# Patient Record
Sex: MALE | Race: BLACK OR AFRICAN AMERICAN | NOT HISPANIC OR LATINO | URBAN - METROPOLITAN AREA
[De-identification: names, ages, dates, MRNs, and addresses within clinical notes are randomized per-mention and may not be internally consistent; named-entity substitution may affect disease eponyms.]

---

## 2021-01-26 VITALS
DIASTOLIC BLOOD PRESSURE: 81 MMHG | OXYGEN SATURATION: 95 % | RESPIRATION RATE: 16 BRPM | SYSTOLIC BLOOD PRESSURE: 145 MMHG | HEART RATE: 110 BPM | TEMPERATURE: 98 F

## 2021-01-26 NOTE — ED ADULT TRIAGE NOTE - ARRIVAL INFO ADDITIONAL COMMENTS
pt sent from [facility] with an H/H of 3 and 12.  pt has hx of MS and denies any bloody stools.  pt has PICC line in place left upper arm for IV antibiotics.

## 2021-01-27 ENCOUNTER — INPATIENT (INPATIENT)
Facility: HOSPITAL | Age: 56
LOS: 5 days | Discharge: NURSING, REHAB OR LONG-TERM CARE | DRG: 377 | End: 2021-02-02
Attending: STUDENT IN AN ORGANIZED HEALTH CARE EDUCATION/TRAINING PROGRAM | Admitting: PSYCHIATRY & NEUROLOGY
Payer: MEDICARE

## 2021-01-27 DIAGNOSIS — D50.0 IRON DEFICIENCY ANEMIA SECONDARY TO BLOOD LOSS (CHRONIC): ICD-10-CM

## 2021-01-27 DIAGNOSIS — F31.9 BIPOLAR DISORDER, UNSPECIFIED: ICD-10-CM

## 2021-01-27 DIAGNOSIS — R53.2 FUNCTIONAL QUADRIPLEGIA: ICD-10-CM

## 2021-01-27 DIAGNOSIS — N31.9 NEUROMUSCULAR DYSFUNCTION OF BLADDER, UNSPECIFIED: ICD-10-CM

## 2021-01-27 DIAGNOSIS — K26.4 CHRONIC OR UNSPECIFIED DUODENAL ULCER WITH HEMORRHAGE: ICD-10-CM

## 2021-01-27 DIAGNOSIS — I24.8 OTHER FORMS OF ACUTE ISCHEMIC HEART DISEASE: ICD-10-CM

## 2021-01-27 DIAGNOSIS — G35 MULTIPLE SCLEROSIS: ICD-10-CM

## 2021-01-27 DIAGNOSIS — J96.01 ACUTE RESPIRATORY FAILURE WITH HYPOXIA: ICD-10-CM

## 2021-01-27 DIAGNOSIS — K92.1 MELENA: ICD-10-CM

## 2021-01-27 DIAGNOSIS — R50.9 FEVER, UNSPECIFIED: ICD-10-CM

## 2021-01-27 DIAGNOSIS — K59.00 CONSTIPATION, UNSPECIFIED: ICD-10-CM

## 2021-01-27 DIAGNOSIS — L89.154 PRESSURE ULCER OF SACRAL REGION, STAGE 4: ICD-10-CM

## 2021-01-27 DIAGNOSIS — Z29.9 ENCOUNTER FOR PROPHYLACTIC MEASURES, UNSPECIFIED: ICD-10-CM

## 2021-01-27 DIAGNOSIS — I10 ESSENTIAL (PRIMARY) HYPERTENSION: ICD-10-CM

## 2021-01-27 DIAGNOSIS — K29.70 GASTRITIS, UNSPECIFIED, WITHOUT BLEEDING: ICD-10-CM

## 2021-01-27 DIAGNOSIS — G62.9 POLYNEUROPATHY, UNSPECIFIED: ICD-10-CM

## 2021-01-27 DIAGNOSIS — D64.9 ANEMIA, UNSPECIFIED: ICD-10-CM

## 2021-01-27 DIAGNOSIS — E78.5 HYPERLIPIDEMIA, UNSPECIFIED: ICD-10-CM

## 2021-01-27 LAB
ALBUMIN SERPL ELPH-MCNC: 2.7 G/DL — LOW (ref 3.3–5)
ALBUMIN SERPL ELPH-MCNC: 2.7 G/DL — LOW (ref 3.3–5)
ALP SERPL-CCNC: 54 U/L — SIGNIFICANT CHANGE UP (ref 40–120)
ALP SERPL-CCNC: 57 U/L — SIGNIFICANT CHANGE UP (ref 40–120)
ALT FLD-CCNC: 9 U/L — LOW (ref 10–45)
ALT FLD-CCNC: 9 U/L — LOW (ref 10–45)
ANION GAP SERPL CALC-SCNC: 10 MMOL/L — SIGNIFICANT CHANGE UP (ref 5–17)
ANION GAP SERPL CALC-SCNC: 11 MMOL/L — SIGNIFICANT CHANGE UP (ref 5–17)
ANION GAP SERPL CALC-SCNC: 9 MMOL/L — SIGNIFICANT CHANGE UP (ref 5–17)
APPEARANCE UR: CLEAR — SIGNIFICANT CHANGE UP
APTT BLD: 25.8 SEC — LOW (ref 27.5–35.5)
AST SERPL-CCNC: 16 U/L — SIGNIFICANT CHANGE UP (ref 10–40)
AST SERPL-CCNC: 16 U/L — SIGNIFICANT CHANGE UP (ref 10–40)
BACTERIA # UR AUTO: PRESENT /HPF
BASOPHILS # BLD AUTO: 0.01 K/UL — SIGNIFICANT CHANGE UP (ref 0–0.2)
BASOPHILS # BLD AUTO: 0.02 K/UL — SIGNIFICANT CHANGE UP (ref 0–0.2)
BASOPHILS NFR BLD AUTO: 0.1 % — SIGNIFICANT CHANGE UP (ref 0–2)
BASOPHILS NFR BLD AUTO: 0.2 % — SIGNIFICANT CHANGE UP (ref 0–2)
BILIRUB SERPL-MCNC: 0.3 MG/DL — SIGNIFICANT CHANGE UP (ref 0.2–1.2)
BILIRUB SERPL-MCNC: 0.4 MG/DL — SIGNIFICANT CHANGE UP (ref 0.2–1.2)
BILIRUB UR-MCNC: NEGATIVE — SIGNIFICANT CHANGE UP
BLD GP AB SCN SERPL QL: NEGATIVE — SIGNIFICANT CHANGE UP
BUN SERPL-MCNC: 11 MG/DL — SIGNIFICANT CHANGE UP (ref 7–23)
BUN SERPL-MCNC: 13 MG/DL — SIGNIFICANT CHANGE UP (ref 7–23)
BUN SERPL-MCNC: 15 MG/DL — SIGNIFICANT CHANGE UP (ref 7–23)
CALCIUM SERPL-MCNC: 7.3 MG/DL — LOW (ref 8.4–10.5)
CALCIUM SERPL-MCNC: 7.5 MG/DL — LOW (ref 8.4–10.5)
CALCIUM SERPL-MCNC: 7.8 MG/DL — LOW (ref 8.4–10.5)
CHLORIDE SERPL-SCNC: 110 MMOL/L — HIGH (ref 96–108)
CHLORIDE SERPL-SCNC: 113 MMOL/L — HIGH (ref 96–108)
CHLORIDE SERPL-SCNC: 114 MMOL/L — HIGH (ref 96–108)
CO2 SERPL-SCNC: 23 MMOL/L — SIGNIFICANT CHANGE UP (ref 22–31)
CO2 SERPL-SCNC: 23 MMOL/L — SIGNIFICANT CHANGE UP (ref 22–31)
CO2 SERPL-SCNC: 26 MMOL/L — SIGNIFICANT CHANGE UP (ref 22–31)
COLOR SPEC: YELLOW — SIGNIFICANT CHANGE UP
COMMENT - URINE: SIGNIFICANT CHANGE UP
CREAT SERPL-MCNC: 0.72 MG/DL — SIGNIFICANT CHANGE UP (ref 0.5–1.3)
CREAT SERPL-MCNC: 0.81 MG/DL — SIGNIFICANT CHANGE UP (ref 0.5–1.3)
CREAT SERPL-MCNC: 0.83 MG/DL — SIGNIFICANT CHANGE UP (ref 0.5–1.3)
DIFF PNL FLD: NEGATIVE — SIGNIFICANT CHANGE UP
EOSINOPHIL # BLD AUTO: 0.16 K/UL — SIGNIFICANT CHANGE UP (ref 0–0.5)
EOSINOPHIL # BLD AUTO: 0.19 K/UL — SIGNIFICANT CHANGE UP (ref 0–0.5)
EOSINOPHIL NFR BLD AUTO: 1.7 % — SIGNIFICANT CHANGE UP (ref 0–6)
EOSINOPHIL NFR BLD AUTO: 1.8 % — SIGNIFICANT CHANGE UP (ref 0–6)
EPI CELLS # UR: SIGNIFICANT CHANGE UP /HPF (ref 0–5)
FERRITIN SERPL-MCNC: 27 NG/ML — LOW (ref 30–400)
FIBRINOGEN PPP-MCNC: 391 MG/DL — SIGNIFICANT CHANGE UP (ref 258–438)
FOLATE SERPL-MCNC: >20 NG/ML — SIGNIFICANT CHANGE UP
GLUCOSE BLDC GLUCOMTR-MCNC: 102 MG/DL — HIGH (ref 70–99)
GLUCOSE SERPL-MCNC: 106 MG/DL — HIGH (ref 70–99)
GLUCOSE SERPL-MCNC: 89 MG/DL — SIGNIFICANT CHANGE UP (ref 70–99)
GLUCOSE SERPL-MCNC: 93 MG/DL — SIGNIFICANT CHANGE UP (ref 70–99)
GLUCOSE UR QL: NEGATIVE — SIGNIFICANT CHANGE UP
HCT VFR BLD CALC: 13.1 % — CRITICAL LOW (ref 39–50)
HCT VFR BLD CALC: 18.9 % — CRITICAL LOW (ref 39–50)
HCT VFR BLD CALC: 20.7 % — CRITICAL LOW (ref 39–50)
HCT VFR BLD CALC: 24.4 % — LOW (ref 39–50)
HCT VFR BLD CALC: 25.8 % — LOW (ref 39–50)
HGB BLD-MCNC: 3.5 G/DL — CRITICAL LOW (ref 13–17)
HGB BLD-MCNC: 5.6 G/DL — CRITICAL LOW (ref 13–17)
HGB BLD-MCNC: 6.3 G/DL — CRITICAL LOW (ref 13–17)
HGB BLD-MCNC: 7.3 G/DL — LOW (ref 13–17)
HGB BLD-MCNC: 8 G/DL — LOW (ref 13–17)
IMM GRANULOCYTES NFR BLD AUTO: 1.4 % — SIGNIFICANT CHANGE UP (ref 0–1.5)
IMM GRANULOCYTES NFR BLD AUTO: 1.8 % — HIGH (ref 0–1.5)
INR BLD: 1.14 — SIGNIFICANT CHANGE UP (ref 0.88–1.16)
IRON SATN MFR SERPL: 11 % — LOW (ref 16–55)
IRON SATN MFR SERPL: 24 UG/DL — LOW (ref 45–165)
KETONES UR-MCNC: NEGATIVE — SIGNIFICANT CHANGE UP
LACTATE SERPL-SCNC: 1.3 MMOL/L — SIGNIFICANT CHANGE UP (ref 0.5–2)
LDH SERPL L TO P-CCNC: 249 U/L — HIGH (ref 50–242)
LEGIONELLA AG UR QL: NEGATIVE — SIGNIFICANT CHANGE UP
LEUKOCYTE ESTERASE UR-ACNC: ABNORMAL
LYMPHOCYTES # BLD AUTO: 1.32 K/UL — SIGNIFICANT CHANGE UP (ref 1–3.3)
LYMPHOCYTES # BLD AUTO: 1.51 K/UL — SIGNIFICANT CHANGE UP (ref 1–3.3)
LYMPHOCYTES # BLD AUTO: 11.7 % — LOW (ref 13–44)
LYMPHOCYTES # BLD AUTO: 16.8 % — SIGNIFICANT CHANGE UP (ref 13–44)
MAGNESIUM SERPL-MCNC: 2.2 MG/DL — SIGNIFICANT CHANGE UP (ref 1.6–2.6)
MAGNESIUM SERPL-MCNC: 2.2 MG/DL — SIGNIFICANT CHANGE UP (ref 1.6–2.6)
MCHC RBC-ENTMCNC: 26.7 GM/DL — LOW (ref 32–36)
MCHC RBC-ENTMCNC: 29.2 PG — SIGNIFICANT CHANGE UP (ref 27–34)
MCHC RBC-ENTMCNC: 29.3 PG — SIGNIFICANT CHANGE UP (ref 27–34)
MCHC RBC-ENTMCNC: 29.4 PG — SIGNIFICANT CHANGE UP (ref 27–34)
MCHC RBC-ENTMCNC: 29.9 GM/DL — LOW (ref 32–36)
MCHC RBC-ENTMCNC: 30.3 PG — SIGNIFICANT CHANGE UP (ref 27–34)
MCHC RBC-ENTMCNC: 30.4 GM/DL — LOW (ref 32–36)
MCHC RBC-ENTMCNC: 31 GM/DL — LOW (ref 32–36)
MCV RBC AUTO: 109.2 FL — HIGH (ref 80–100)
MCV RBC AUTO: 94.5 FL — SIGNIFICANT CHANGE UP (ref 80–100)
MCV RBC AUTO: 98.4 FL — SIGNIFICANT CHANGE UP (ref 80–100)
MCV RBC AUTO: 99.5 FL — SIGNIFICANT CHANGE UP (ref 80–100)
MONOCYTES # BLD AUTO: 0.63 K/UL — SIGNIFICANT CHANGE UP (ref 0–0.9)
MONOCYTES # BLD AUTO: 0.73 K/UL — SIGNIFICANT CHANGE UP (ref 0–0.9)
MONOCYTES NFR BLD AUTO: 6.5 % — SIGNIFICANT CHANGE UP (ref 2–14)
MONOCYTES NFR BLD AUTO: 7 % — SIGNIFICANT CHANGE UP (ref 2–14)
NEUTROPHILS # BLD AUTO: 6.52 K/UL — SIGNIFICANT CHANGE UP (ref 1.8–7.4)
NEUTROPHILS # BLD AUTO: 8.88 K/UL — HIGH (ref 1.8–7.4)
NEUTROPHILS NFR BLD AUTO: 72.5 % — SIGNIFICANT CHANGE UP (ref 43–77)
NEUTROPHILS NFR BLD AUTO: 78.5 % — HIGH (ref 43–77)
NITRITE UR-MCNC: NEGATIVE — SIGNIFICANT CHANGE UP
NRBC # BLD: 0 /100 WBCS — SIGNIFICANT CHANGE UP (ref 0–0)
NRBC # BLD: 1 /100 WBCS — HIGH (ref 0–0)
NRBC # BLD: 2 /100 WBCS — HIGH (ref 0–0)
NRBC # BLD: 2 /100 WBCS — HIGH (ref 0–0)
OB PNL STL: POSITIVE
OSMOLALITY SERPL: 310 MOSM/KG — HIGH (ref 275–300)
PH UR: 6 — SIGNIFICANT CHANGE UP (ref 5–8)
PHOSPHATE SERPL-MCNC: 2.9 MG/DL — SIGNIFICANT CHANGE UP (ref 2.5–4.5)
PLATELET # BLD AUTO: 141 K/UL — LOW (ref 150–400)
PLATELET # BLD AUTO: 154 K/UL — SIGNIFICANT CHANGE UP (ref 150–400)
PLATELET # BLD AUTO: 166 K/UL — SIGNIFICANT CHANGE UP (ref 150–400)
PLATELET # BLD AUTO: 229 K/UL — SIGNIFICANT CHANGE UP (ref 150–400)
POTASSIUM SERPL-MCNC: 3.8 MMOL/L — SIGNIFICANT CHANGE UP (ref 3.5–5.3)
POTASSIUM SERPL-SCNC: 3.8 MMOL/L — SIGNIFICANT CHANGE UP (ref 3.5–5.3)
PROCALCITONIN SERPL-MCNC: 0.5 NG/ML — HIGH (ref 0.02–0.1)
PROT SERPL-MCNC: 5.6 G/DL — LOW (ref 6–8.3)
PROT SERPL-MCNC: 6.1 G/DL — SIGNIFICANT CHANGE UP (ref 6–8.3)
PROT UR-MCNC: ABNORMAL MG/DL
PROTHROM AB SERPL-ACNC: 13.6 SEC — SIGNIFICANT CHANGE UP (ref 10.6–13.6)
RBC # BLD: 1.2 M/UL — LOW (ref 4.2–5.8)
RBC # BLD: 2.08 M/UL — LOW (ref 4.2–5.8)
RBC # BLD: 2.48 M/UL — LOW (ref 4.2–5.8)
RBC # BLD: 2.73 M/UL — LOW (ref 4.2–5.8)
RBC # FLD: 18.7 % — HIGH (ref 10.3–14.5)
RBC # FLD: 19.1 % — HIGH (ref 10.3–14.5)
RBC # FLD: 19.6 % — HIGH (ref 10.3–14.5)
RBC # FLD: 22.5 % — HIGH (ref 10.3–14.5)
RBC CASTS # UR COMP ASSIST: < 5 /HPF — SIGNIFICANT CHANGE UP
RETICS #: 175.8 K/UL — HIGH (ref 25–125)
RETICS/RBC NFR: 8.4 % — HIGH (ref 0.5–2.5)
RH IG SCN BLD-IMP: POSITIVE — SIGNIFICANT CHANGE UP
RH IG SCN BLD-IMP: POSITIVE — SIGNIFICANT CHANGE UP
S PNEUM AG UR QL: NEGATIVE — SIGNIFICANT CHANGE UP
SARS-COV-2 IGG SERPL QL IA: POSITIVE
SARS-COV-2 IGM SERPL IA-ACNC: 1.8 INDEX — HIGH
SARS-COV-2 RNA SPEC QL NAA+PROBE: SIGNIFICANT CHANGE UP
SODIUM SERPL-SCNC: 145 MMOL/L — SIGNIFICANT CHANGE UP (ref 135–145)
SODIUM SERPL-SCNC: 147 MMOL/L — HIGH (ref 135–145)
SODIUM SERPL-SCNC: 147 MMOL/L — HIGH (ref 135–145)
SP GR SPEC: 1.02 — SIGNIFICANT CHANGE UP (ref 1–1.03)
T4 AB SER-ACNC: 5.14 UG/DL — SIGNIFICANT CHANGE UP (ref 3.17–11.72)
TIBC SERPL-MCNC: 211 UG/DL — LOW (ref 220–430)
TRANSFERRIN SERPL-MCNC: 158 MG/DL — LOW (ref 200–360)
TROPONIN T SERPL-MCNC: 0.03 NG/ML — HIGH (ref 0–0.01)
TROPONIN T SERPL-MCNC: 0.03 NG/ML — HIGH (ref 0–0.01)
TSH SERPL-MCNC: 0.78 UIU/ML — SIGNIFICANT CHANGE UP (ref 0.35–4.94)
UIBC SERPL-MCNC: 187 UG/DL — SIGNIFICANT CHANGE UP (ref 110–370)
UROBILINOGEN FLD QL: 0.2 E.U./DL — SIGNIFICANT CHANGE UP
VANCOMYCIN TROUGH SERPL-MCNC: 9.9 UG/ML — LOW (ref 10–20)
VIT B12 SERPL-MCNC: 1339 PG/ML — HIGH (ref 232–1245)
WBC # BLD: 10.61 K/UL — HIGH (ref 3.8–10.5)
WBC # BLD: 11.3 K/UL — HIGH (ref 3.8–10.5)
WBC # BLD: 7.28 K/UL — SIGNIFICANT CHANGE UP (ref 3.8–10.5)
WBC # BLD: 8.99 K/UL — SIGNIFICANT CHANGE UP (ref 3.8–10.5)
WBC # FLD AUTO: 10.61 K/UL — HIGH (ref 3.8–10.5)
WBC # FLD AUTO: 11.3 K/UL — HIGH (ref 3.8–10.5)
WBC # FLD AUTO: 7.28 K/UL — SIGNIFICANT CHANGE UP (ref 3.8–10.5)
WBC # FLD AUTO: 8.99 K/UL — SIGNIFICANT CHANGE UP (ref 3.8–10.5)
WBC UR QL: ABNORMAL /HPF

## 2021-01-27 PROCEDURE — 99222 1ST HOSP IP/OBS MODERATE 55: CPT

## 2021-01-27 PROCEDURE — 71045 X-RAY EXAM CHEST 1 VIEW: CPT | Mod: 26

## 2021-01-27 PROCEDURE — 93010 ELECTROCARDIOGRAM REPORT: CPT

## 2021-01-27 PROCEDURE — 99223 1ST HOSP IP/OBS HIGH 75: CPT | Mod: GC

## 2021-01-27 PROCEDURE — 99291 CRITICAL CARE FIRST HOUR: CPT

## 2021-01-27 PROCEDURE — 99053 MED SERV 10PM-8AM 24 HR FAC: CPT

## 2021-01-27 RX ORDER — SODIUM CHLORIDE 0.65 %
1 AEROSOL, SPRAY (ML) NASAL
Refills: 0 | Status: DISCONTINUED | OUTPATIENT
Start: 2021-01-27 | End: 2021-02-02

## 2021-01-27 RX ORDER — MORPHINE SULFATE 50 MG/1
15 CAPSULE, EXTENDED RELEASE ORAL EVERY 8 HOURS
Refills: 0 | Status: DISCONTINUED | OUTPATIENT
Start: 2021-01-27 | End: 2021-01-27

## 2021-01-27 RX ORDER — MULTIVIT-MIN/FERROUS GLUCONATE 9 MG/15 ML
1 LIQUID (ML) ORAL
Qty: 0 | Refills: 0 | DISCHARGE

## 2021-01-27 RX ORDER — ASCORBIC ACID 60 MG
1 TABLET,CHEWABLE ORAL
Qty: 0 | Refills: 0 | DISCHARGE

## 2021-01-27 RX ORDER — SOD SULF/SODIUM/NAHCO3/KCL/PEG
2000 SOLUTION, RECONSTITUTED, ORAL ORAL ONCE
Refills: 0 | Status: COMPLETED | OUTPATIENT
Start: 2021-01-27 | End: 2021-01-27

## 2021-01-27 RX ORDER — DOCUSATE SODIUM 100 MG
1 CAPSULE ORAL
Qty: 0 | Refills: 0 | DISCHARGE

## 2021-01-27 RX ORDER — CHOLECALCIFEROL (VITAMIN D3) 125 MCG
1000 CAPSULE ORAL DAILY
Refills: 0 | Status: DISCONTINUED | OUTPATIENT
Start: 2021-01-27 | End: 2021-02-02

## 2021-01-27 RX ORDER — MORPHINE SULFATE 50 MG/1
15 CAPSULE, EXTENDED RELEASE ORAL EVERY 8 HOURS
Refills: 0 | Status: DISCONTINUED | OUTPATIENT
Start: 2021-01-27 | End: 2021-02-02

## 2021-01-27 RX ORDER — METOPROLOL TARTRATE 50 MG
50 TABLET ORAL
Refills: 0 | Status: DISCONTINUED | OUTPATIENT
Start: 2021-01-27 | End: 2021-01-29

## 2021-01-27 RX ORDER — ACETAMINOPHEN 500 MG
2 TABLET ORAL
Qty: 0 | Refills: 0 | DISCHARGE

## 2021-01-27 RX ORDER — SENNA PLUS 8.6 MG/1
2 TABLET ORAL AT BEDTIME
Refills: 0 | Status: DISCONTINUED | OUTPATIENT
Start: 2021-01-27 | End: 2021-02-02

## 2021-01-27 RX ORDER — ATORVASTATIN CALCIUM 80 MG/1
20 TABLET, FILM COATED ORAL AT BEDTIME
Refills: 0 | Status: DISCONTINUED | OUTPATIENT
Start: 2021-01-27 | End: 2021-02-02

## 2021-01-27 RX ORDER — DOCUSATE SODIUM 100 MG
3 CAPSULE ORAL
Qty: 0 | Refills: 0 | DISCHARGE

## 2021-01-27 RX ORDER — ACETAMINOPHEN 500 MG
650 TABLET ORAL EVERY 6 HOURS
Refills: 0 | Status: DISCONTINUED | OUTPATIENT
Start: 2021-01-27 | End: 2021-02-02

## 2021-01-27 RX ORDER — CHOLECALCIFEROL (VITAMIN D3) 125 MCG
1000 CAPSULE ORAL
Qty: 0 | Refills: 0 | DISCHARGE

## 2021-01-27 RX ORDER — ZINC SULFATE TAB 220 MG (50 MG ZINC EQUIVALENT) 220 (50 ZN) MG
220 TAB ORAL DAILY
Refills: 0 | Status: DISCONTINUED | OUTPATIENT
Start: 2021-01-27 | End: 2021-02-02

## 2021-01-27 RX ORDER — PANTOPRAZOLE SODIUM 20 MG/1
80 TABLET, DELAYED RELEASE ORAL ONCE
Refills: 0 | Status: COMPLETED | OUTPATIENT
Start: 2021-01-27 | End: 2021-01-27

## 2021-01-27 RX ORDER — SODIUM CHLORIDE 0.65 %
0 AEROSOL, SPRAY (ML) NASAL
Qty: 0 | Refills: 0 | DISCHARGE

## 2021-01-27 RX ORDER — METOPROLOL TARTRATE 50 MG
1 TABLET ORAL
Qty: 0 | Refills: 0 | DISCHARGE

## 2021-01-27 RX ORDER — DIAZEPAM 5 MG
5 TABLET ORAL ONCE
Refills: 0 | Status: DISCONTINUED | OUTPATIENT
Start: 2021-01-27 | End: 2021-01-27

## 2021-01-27 RX ORDER — ZINC SULFATE TAB 220 MG (50 MG ZINC EQUIVALENT) 220 (50 ZN) MG
1 TAB ORAL
Qty: 0 | Refills: 0 | DISCHARGE

## 2021-01-27 RX ORDER — POTASSIUM CHLORIDE 20 MEQ
20 PACKET (EA) ORAL ONCE
Refills: 0 | Status: COMPLETED | OUTPATIENT
Start: 2021-01-27 | End: 2021-01-27

## 2021-01-27 RX ORDER — SENNA PLUS 8.6 MG/1
2 TABLET ORAL
Qty: 0 | Refills: 0 | DISCHARGE

## 2021-01-27 RX ORDER — OMEGA-3 ACID ETHYL ESTERS 1 G
1 CAPSULE ORAL
Qty: 0 | Refills: 0 | DISCHARGE

## 2021-01-27 RX ORDER — SODIUM CHLORIDE 9 MG/ML
1000 INJECTION INTRAMUSCULAR; INTRAVENOUS; SUBCUTANEOUS ONCE
Refills: 0 | Status: COMPLETED | OUTPATIENT
Start: 2021-01-27 | End: 2021-01-27

## 2021-01-27 RX ORDER — MULTIVIT-MIN/FERROUS GLUCONATE 9 MG/15 ML
1 LIQUID (ML) ORAL DAILY
Refills: 0 | Status: DISCONTINUED | OUTPATIENT
Start: 2021-01-27 | End: 2021-02-02

## 2021-01-27 RX ORDER — FERROUS SULFATE 325(65) MG
325 TABLET ORAL DAILY
Refills: 0 | Status: DISCONTINUED | OUTPATIENT
Start: 2021-01-27 | End: 2021-02-02

## 2021-01-27 RX ORDER — SODIUM CHLORIDE 0.65 %
1 AEROSOL, SPRAY (ML) NASAL
Qty: 0 | Refills: 0 | DISCHARGE

## 2021-01-27 RX ORDER — MORPHINE SULFATE 50 MG/1
1 CAPSULE, EXTENDED RELEASE ORAL
Qty: 0 | Refills: 0 | DISCHARGE

## 2021-01-27 RX ORDER — SODIUM CHLORIDE 9 MG/ML
1000 INJECTION, SOLUTION INTRAVENOUS
Refills: 0 | Status: DISCONTINUED | OUTPATIENT
Start: 2021-01-27 | End: 2021-01-28

## 2021-01-27 RX ORDER — POLYETHYLENE GLYCOL 3350 17 G/17G
17 POWDER, FOR SOLUTION ORAL
Qty: 0 | Refills: 0 | DISCHARGE

## 2021-01-27 RX ORDER — LACTULOSE 10 G/15ML
30 SOLUTION ORAL
Qty: 0 | Refills: 0 | DISCHARGE

## 2021-01-27 RX ORDER — PIPERACILLIN AND TAZOBACTAM 4; .5 G/20ML; G/20ML
3.38 INJECTION, POWDER, LYOPHILIZED, FOR SOLUTION INTRAVENOUS EVERY 8 HOURS
Refills: 0 | Status: DISCONTINUED | OUTPATIENT
Start: 2021-01-27 | End: 2021-01-27

## 2021-01-27 RX ORDER — ASCORBIC ACID 60 MG
500 TABLET,CHEWABLE ORAL DAILY
Refills: 0 | Status: DISCONTINUED | OUTPATIENT
Start: 2021-01-27 | End: 2021-02-02

## 2021-01-27 RX ORDER — OXYBUTYNIN CHLORIDE 5 MG
1 TABLET ORAL
Qty: 0 | Refills: 0 | DISCHARGE

## 2021-01-27 RX ORDER — ATORVASTATIN CALCIUM 80 MG/1
1 TABLET, FILM COATED ORAL
Qty: 0 | Refills: 0 | DISCHARGE

## 2021-01-27 RX ORDER — PANTOPRAZOLE SODIUM 20 MG/1
8 TABLET, DELAYED RELEASE ORAL
Qty: 80 | Refills: 0 | Status: DISCONTINUED | OUTPATIENT
Start: 2021-01-27 | End: 2021-01-28

## 2021-01-27 RX ORDER — METOPROLOL TARTRATE 50 MG
50 TABLET ORAL
Refills: 0 | Status: DISCONTINUED | OUTPATIENT
Start: 2021-01-27 | End: 2021-01-27

## 2021-01-27 RX ORDER — FERROUS SULFATE 325(65) MG
1 TABLET ORAL
Qty: 0 | Refills: 0 | DISCHARGE

## 2021-01-27 RX ORDER — OXYBUTYNIN CHLORIDE 5 MG
5 TABLET ORAL EVERY 12 HOURS
Refills: 0 | Status: DISCONTINUED | OUTPATIENT
Start: 2021-01-27 | End: 2021-02-02

## 2021-01-27 RX ADMIN — Medication 5 MILLIGRAM(S): at 06:15

## 2021-01-27 RX ADMIN — Medication 325 MILLIGRAM(S): at 17:27

## 2021-01-27 RX ADMIN — MORPHINE SULFATE 15 MILLIGRAM(S): 50 CAPSULE, EXTENDED RELEASE ORAL at 21:37

## 2021-01-27 RX ADMIN — PANTOPRAZOLE SODIUM 10 MG/HR: 20 TABLET, DELAYED RELEASE ORAL at 20:28

## 2021-01-27 RX ADMIN — SODIUM CHLORIDE 1000 MILLILITER(S): 9 INJECTION INTRAMUSCULAR; INTRAVENOUS; SUBCUTANEOUS at 01:04

## 2021-01-27 RX ADMIN — PIPERACILLIN AND TAZOBACTAM 25 GRAM(S): 4; .5 INJECTION, POWDER, LYOPHILIZED, FOR SOLUTION INTRAVENOUS at 06:20

## 2021-01-27 RX ADMIN — SODIUM CHLORIDE 80 MILLILITER(S): 9 INJECTION, SOLUTION INTRAVENOUS at 17:28

## 2021-01-27 RX ADMIN — Medication 50 MILLIGRAM(S): at 17:28

## 2021-01-27 RX ADMIN — ATORVASTATIN CALCIUM 20 MILLIGRAM(S): 80 TABLET, FILM COATED ORAL at 21:37

## 2021-01-27 RX ADMIN — PANTOPRAZOLE SODIUM 10 MG/HR: 20 TABLET, DELAYED RELEASE ORAL at 02:41

## 2021-01-27 RX ADMIN — SODIUM CHLORIDE 1000 MILLILITER(S): 9 INJECTION INTRAMUSCULAR; INTRAVENOUS; SUBCUTANEOUS at 03:15

## 2021-01-27 RX ADMIN — Medication 5 MILLIGRAM(S): at 17:27

## 2021-01-27 RX ADMIN — Medication 50 MILLIGRAM(S): at 09:30

## 2021-01-27 RX ADMIN — Medication 50 MILLIGRAM(S): at 17:27

## 2021-01-27 RX ADMIN — PANTOPRAZOLE SODIUM 10 MG/HR: 20 TABLET, DELAYED RELEASE ORAL at 12:04

## 2021-01-27 RX ADMIN — Medication 1 TABLET(S): at 19:04

## 2021-01-27 RX ADMIN — ZINC SULFATE TAB 220 MG (50 MG ZINC EQUIVALENT) 220 MILLIGRAM(S): 220 (50 ZN) TAB at 21:37

## 2021-01-27 RX ADMIN — Medication 500 MILLIGRAM(S): at 17:27

## 2021-01-27 RX ADMIN — Medication 1000 UNIT(S): at 19:04

## 2021-01-27 RX ADMIN — Medication 2000 MILLILITER(S): at 19:04

## 2021-01-27 RX ADMIN — PANTOPRAZOLE SODIUM 80 MILLIGRAM(S): 20 TABLET, DELAYED RELEASE ORAL at 00:15

## 2021-01-27 NOTE — H&P ADULT - ASSESSMENT
Pt is a 56 y/o male, poor historian w/ pmhx of MS, quadriplegia, iron deficiency anemia, HTN, HLD, bipolar disorder, sicca syndrome, polyneuropathy, chronic pain, pressure ulcer, mrsa infection, presenting from nursing home with Hb of 3.6. Pt admitted for severe anemia likely 2/2 to possible upper GI bleed.

## 2021-01-27 NOTE — PROGRESS NOTE ADULT - PROBLEM SELECTOR PLAN 1
Patient with hx of PERI, poor historian but reports history of "low blood levels" Now presented with Hb 3.5 and FOBT positive in ED. High suspicion of upper GI bleed  - f/u iron panel, B12 and folate  - f/u fibrinogen   - s/p 2U PRBC in ED, getting another 1U now  - check post transfusion CBC, will likely require additional PRBC  - f/u CBC q4h after transfusion  - Transfuse Hb >7 IMPROVING  Patient with hx of PERI, poor historian but reports history of "low blood levels" Presented w/ 3.5 and FOBT positive in ED.   -S/P 4u prbc on adm, with improvement in hb to 8.0. Will f/u 6 PM repeat CBC.   - B12 1339, Folate > 20  - Iron studies showing PERI  - Transfuse Hb >7  - GI workup as detailed below.

## 2021-01-27 NOTE — H&P ADULT - HISTORY OF PRESENT ILLNESS
Pt is a 56 y/o male, poor historian w/ pmhx of MS, quadriplegia, iron deficiency anemia, HTN, HLD, bipolar disorder, sicca syndrome, polyneuropathy, chronic pain, pressure ulcers, mrsa infections, presenting from nursing home with Hb of 3.6. Pt reports complaints of intermittent chest tightness and SOB for past few days. He states the chest tightness is across his chest, non radiating. No exacerbating or improving factors. He states he is unaware if he has had any black or bloody stools. Denies n/v or hemoptysis. Also complains of left sided abd pain, fever and chills while at nursing home. Pt denies hx of GI bleed, states that he does not think that he takes any nsaids. Per nursing home notes, pt was recently started on vanc and zosyn 1/25 for fever. On further inquiry from patient, he states they started him on abx because he had a fever of unclear source.    In ED, vitals: T 98.4, , /81, RR 16, O2 sat 95% on RA --> 90% RA, placed on 5 L NC --> 98%.   Labs significant for Hb/hct 3.5/13.1, troponin 0.03  EKG: sinus tachycardia with ST depressions in I, avL, v2, v4-v6, no prior EKG to compare  CXR: no acute infiltrates   In ED, Pt received 1 L NS, 2 units of prbc and started on PPI gtt. GI was also consulted.  Pt is a 56 y/o male, poor historian w/ pmhx of MS, quadriplegia, iron deficiency anemia, HTN, HLD, bipolar disorder, sicca syndrome, polyneuropathy, chronic pain, pressure ulcers, mrsa infections, presenting from nursing home with Hb of 3.6. Pt reports complaints of intermittent chest tightness and SOB for past few days. He states the chest tightness is across his chest, non radiating. No exacerbating or improving factors. He states he is unaware if he has had any black or bloody stools. Denies n/v or hemoptysis. Also complains of left sided abd pain, fever and chills while at nursing home. Pt denies hx of GI bleed, states that he does not think that he takes any nsaids. Per nursing home notes, pt was recently started on vanc and zosyn 1/25 for fever. On further inquiry from patient, he states they started him on abx because he had a fever of unclear source.    In ED, vitals: T 98.4, , /81, RR 16, O2 sat 95% on RA --> 90% RA, placed on 5 L NC --> 98%.   Labs significant for Hb/hct 3.5/13.1, troponin 0.03  EKG: sinus tachycardia with TWI in I, avL, v2, v4-v6, no prior EKG to compare  CXR: no acute infiltrates   In ED, Pt received 1 L NS, 2U PRBC and started on PPI gtt. GI was also consulted.  Pt is a 56 y/o male, poor historian w/ pmhx of MS, quadriplegia, iron deficiency anemia, HTN, HLD, bipolar disorder, sicca syndrome, polyneuropathy, chronic pain, pressure ulcers, mrsa infections, presenting from nursing home with Hb of 3.6. Pt reports complaints of intermittent chest tightness and SOB for past few days. He states the chest tightness is across his chest, non radiating. No exacerbating or improving factors. He states he is unaware if he has had any black or bloody stools. Denies n/v or hemoptysis. Also complains of left sided abd pain, fever and chills while at nursing home. Pt denies hx of GI bleed, states that he does not think that he takes any nsaids. Per nursing home notes, pt was recently started on vanc and zosyn 1/25 for fever. On further inquiry from patient, he states they started him on abx because he had a fever of unclear source.    In ED, vitals: T 98.4, , /81, RR 16, O2 sat 95% on RA --> 90% RA, placed on 5 L NC --> 98%.   Labs significant for Hb/hct 3.5/13.1, troponin 0.03  EKG: sinus tachycardia with TWI in I, avL, v2, v4-v6, no prior EKG to compare  CXR: R hilar infiltrate?  In ED, Pt received 1 L NS, 2U PRBC and started on PPI gtt. GI was also consulted.

## 2021-01-27 NOTE — H&P ADULT - PROBLEM SELECTOR PLAN 1
Patient with hx of PERI, poor historian but reports history of "low blood levels" Now presented with Hb 3.5 and FOBT positive in ED. High suspicion of upper GI bleed  - f/u iron panel, B12 and folate  - f/u fibrinogen   - s/p 2U PRBC in ED, getting another 1U now  - check post transfusion CBC, will likely require additional PRBC  - f/u CBC q4h after transfusion  - Transfuse Hb >7

## 2021-01-27 NOTE — ED PROVIDER NOTE - OBJECTIVE STATEMENT
56 yo m brought to [facility] ED from [facility] for reported severe anemia on blood work.  Patient with multiple medical problems and s/p admission to [facility] for unknown reason; transfer papers have problem list and med list, but it is unclear why he was recently hospitalized.  He says he feels weak, and knows "blood counts low" but otherwise not a helpful historian.     Extensive PMHx includes: HTN, bipolar, osteoarthritis, pressure ulcer, Sicca synd, constipation, polyneuropathy, chronic pain, nausea/vomiting, gastritis, other cerebrovascular disease, hematuria, hypotension, fever, multiple sclerosis, tachycardia, bacterial intestinal infction, genital herpes, iron def anemia, UTI, GERD, dysphagia, mixed hyperlipidemia, streptococcal sepsis, sepsis due to MRSA, gonorrhea, bacterial pneumonia, DVT RLE, anemia in chronic kidney disease, influenza with encephalopathy, accidental opiod OD, quadriplegia, Vit C deficiency, hyperosmolality and hypernatremia, angina pectoris, cellulitis, postherpetic trigeminal neuralgia 56 yo m brought to [facility] ED from [facility] for reported severe anemia on blood work.  Patient with multiple medical problems and s/p admission to [facility] for unknown reason; transfer papers have problem list and med list, but it is unclear why he was recently hospitalized.  He says he feels weak, and knows "blood counts low".  Doesn't know why he was at [facility].  Admits to fatigue and chest tightness, but otherwise not a helpful historian.     Extensive PMHx includes: HTN, bipolar, osteoarthritis, pressure ulcer, Sicca synd, constipation, polyneuropathy, chronic pain, nausea/vomiting, gastritis, other cerebrovascular disease, hematuria, hypotension, fever, multiple sclerosis, tachycardia, bacterial intestinal infction, genital herpes, iron def anemia, UTI, GERD, dysphagia, mixed hyperlipidemia, streptococcal sepsis, sepsis due to MRSA, gonorrhea, bacterial pneumonia, DVT RLE, anemia in chronic kidney disease, influenza with encephalopathy, accidental opiod OD, quadriplegia, Vit C deficiency, hyperosmolality and hypernatremia, angina pectoris, cellulitis, postherpetic trigeminal neuralgia

## 2021-01-27 NOTE — H&P ADULT - PROBLEM SELECTOR PLAN 9
F: s/p 3U PRBC  E: replete PRN  N: NPO for possible EGD    DVT ppx: SCDs, hold lovenox in setting of r/o GIB  Dispo: 7LA

## 2021-01-27 NOTE — H&P ADULT - PROBLEM SELECTOR PLAN 2
Possible upper GI bleed   - Per ED rectal exam, ?melena, black secretions. positive FOBT.  - Hb 3.5, s/p 2 units of prbc. Transfuse to Hb >7  - GI consulted  - c/w PPI gtt and keep NPO  - maintain active type and screens  - maintain IV access

## 2021-01-27 NOTE — ED PROVIDER NOTE - PROGRESS NOTE DETAILS
biba from NH c/o hb 3s (prior 11, 12/2020). pt w/ mild diffuse chest tightness. afebrile. sinus tachy. sbp 140s. hypoxia 90%s on RA. pallor. cap refill 3-4sec. empty rectal vault w/ black secretions. no antiplatelet/AC. will order 1u uncrossed prbc and 2u crossed prbc. patient verbally consented to blood transfusion, unable to sign due to quadriplegia. ED chest u/s: no pulm edema or ptx.  limited ED echo given heart positioning behind sternum: no pericardial effusion, good EF. biba from NH for hb 3s (prior 11, 12/2020). pt c/o mild diffuse chest tightness. a+ox3. afebrile. sinus tachy. sbp 140s. hypoxia 90%s on RA. pallor. cap refill 3-4sec. empty rectal vault w/ black secretions. no antiplatelet/AC. will order 1u uncrossed prbc and 2u crossed prbc. ICU consult called s/p 1u uncrossed prbc. resolved cp. weaned to 3L. hr 90s. bp stable. biba from NH for hb 3s on 1/26/21 6:50am (prior 11, 12/2020). pt c/o mild diffuse chest tightness. a+ox3. afebrile. sinus tachy. sbp 140s. hypoxia 90%s on RA. pallor. cap refill 3-4sec. empty rectal vault w/ black secretions. no antiplatelet/AC. will order 1u uncrossed prbc and 2u crossed prbc. d/w GI fellow. Keep NPO.  Continue PPI.  Will consult in morning.

## 2021-01-27 NOTE — ED PROVIDER NOTE - SHIFT CHANGE DETAILS
55M MS biba for symptomatic anemia hb 3s w/ cp/hypoxia in setting of melena. no antiplatelet/AC. no active hemorrhage. s/p 1u uncrossed prbc w/ resolved sx, pending 2u crossed prbc. s/p protonix ivp/gtt. trop 0.03. ekg w/ inferolateral std/t inv. micu/GI consulted.    dispo: pending repeat h+h/trop/ekg, micu/GI reccs -- anticipate admission

## 2021-01-27 NOTE — CONSULT NOTE ADULT - SUBJECTIVE AND OBJECTIVE BOX
HPI:  Pt is a 56 y/o male, poor historian w/ pmhx of MS, quadriplegia, iron deficiency anemia, HTN, HLD, bipolar disorder, sicca syndrome, polyneuropathy, chronic pain, pressure ulcers, mrsa infections, presenting from nursing home with Hb of 3.6. Patient reports feeling fatigued at nursing home. He denies abd pain, nausea, vomiting, hematemesis. He has Bm daily but does not look at the stool. No one at NH has told him he has blood in stool. He takes advil once a month for pain. He does not smoke, drink. He had colonoscopy one year ago at [facility], one benign polyp. He is not sure if he had a EGD  He reports history of iron def anemia, takes iron at home. Denies ever having blood transfusion in the past    Allergies    No Known Allergies    Intolerances      Home Medications:  acetaminophen 500 mg oral tablet: 2 tab(s) orally 3 times a day, As Needed (27 Jan 2021 04:06)  Artificial Tears ophthalmic solution: 1 drop(s) to each affected eye 4 times a day (27 Jan 2021 04:06)  atorvastatin 20 mg oral tablet: 1 tab(s) orally once a day (27 Jan 2021 04:06)  cholecalciferol oral tablet: 1000 unit(s) orally once a day (27 Jan 2021 04:06)  docusate potassium 100 mg oral capsule: 3 cap(s) orally once a day (at bedtime) (27 Jan 2021 04:06)  ferrous sulfate 325 mg (65 mg elemental iron) oral tablet: 1 tab(s) orally once a day (27 Jan 2021 04:06)  lactulose 10 g/15 mL oral solution: 30 milliliter(s) orally 2 times a day (27 Jan 2021 04:06)  Metoprolol Tartrate 100 mg oral tablet: 1 tab(s) orally 2 times a day (27 Jan 2021 04:06)  MS Contin 15 mg oral tablet, extended release: 1 tab(s) orally every 8 hours (27 Jan 2021 04:06)  Omega-3 1000 mg oral capsule: 1 cap(s) orally once a day (27 Jan 2021 04:06)  oxybutynin 5 mg oral tablet: 1 tab(s) orally 2 times a day (27 Jan 2021 04:06)  [id number] oral powder for reconstitution: 17 gram(s) orally 2 times a day (27 Jan 2021 04:06)  pregabalin 50 mg oral capsule: 1 cap(s) orally 3 times a day (27 Jan 2021 04:06)  Senna 8.6 mg oral tablet: 2 tab(s) orally once a day (at bedtime) (27 Jan 2021 04:06)  sodium chloride nasal spray:  (27 Jan 2021 04:06)  Theragran-M oral tablet: 1 tab(s) orally once a day (27 Jan 2021 04:06)  Vitamin C 500 mg oral tablet: 1 tab(s) orally once a day (27 Jan 2021 04:06)  zinc sulfate 220 mg oral capsule: 1 cap(s) orally once a day (27 Jan 2021 04:06)    MEDICATIONS:  MEDICATIONS  (STANDING):  atorvastatin 20 milliGRAM(s) Oral at bedtime  oxybutynin 5 milliGRAM(s) Oral every 12 hours  pantoprazole Infusion 8 mG/Hr (10 mL/Hr) IV Continuous <Continuous>  piperacillin/tazobactam IVPB.. 3.375 Gram(s) IV Intermittent every 8 hours  pregabalin 50 milliGRAM(s) Oral every 8 hours    MEDICATIONS  (PRN):  acetaminophen   Tablet .. 650 milliGRAM(s) Oral every 6 hours PRN Moderate Pain (4 - 6)  morphine ER Tablet 15 milliGRAM(s) Oral every 8 hours PRN severe chronic pain    PAST MEDICAL & SURGICAL HISTORY:  MRSA infection    Bipolar disorder    Hypertension    Iron deficiency anemia    Multiple sclerosis      FAMILY HISTORY:  Denies FH of malignancy    SOCIAL HISTORY:  [place]: denies  Alcohol: denies  Illicit Drugs: denies     REVIEW OF SYSTEMS:  CONSTITUTIONAL: No weakness, fevers or chills  HEENT: No visual changes; No vertigo or throat pain   NECK: No pain or stiffness  RESPIRATORY: No cough, wheezing, hemoptysis; No shortness of breath  CARDIOVASCULAR: No chest pain or palpitations  GASTROINTESTINAL: No abdominal or epigastric pain. No nausea, vomiting, or hematemesis; No diarrhea or constipation. No melena or hematochezia.  GENITOURINARY: No dysuria, frequency or hematuria  NEUROLOGICAL: No numbness or weakness  SKIN: No itching, burning, rashes, or lesions   All other 10 review of systems is negative unless indicated above.    Vital Signs Last 24 Hrs  T(C): 36.8 (27 Jan 2021 09:35), Max: 37.9 (27 Jan 2021 02:24)  T(F): 98.3 (27 Jan 2021 09:35), Max: 100.2 (27 Jan 2021 02:24)  HR: 87 (27 Jan 2021 08:56) (83 - 110)  BP: 119/67 (27 Jan 2021 08:56) (113/67 - 145/81)  BP(mean): --  RR: 15 (27 Jan 2021 08:56) (15 - 18)  SpO2: 94% (27 Jan 2021 08:56) (90% - 100%)      PHYSICAL EXAM:    General: Well developed; well nourished; in no acute distress  Eyes: Anicteric sclerae, moist conjunctivae  HENT: Moist mucous membranes  Neck: Trachea midline, supple  Lungs: Normal respiratory effors and no intercostal retractions  Cardiovascular: RRR  Abdomen: Soft, non-tender non-distended; Normal bowel sounds; No rebound or guarding, baclofen pump palpated   Neurological: Alert and oriented x3  Skin: Warm and dry. No obvious rash  Rectal: sacral decubitus  Dark brown stool     LABS:                        6.3    11.30 )-----------( 166      ( 27 Jan 2021 08:17 )             20.7     01-27    147<H>  |  113<H>  |  13  ----------------------------<  93  3.8   |  23  |  0.83    Ca    7.3<L>      27 Jan 2021 08:17  Phos  2.9     01-27  Mg     2.2     01-27    TPro  5.6<L>  /  Alb  2.7<L>  /  TBili  0.4  /  DBili  x   /  AST  16  /  ALT  9<L>  /  AlkPhos  57  01-27        PT/INR - ( 27 Jan 2021 00:33 )   PT: 13.6 sec;   INR: 1.14          PTT - ( 27 Jan 2021 00:33 )  PTT:25.8 sec    RADIOLOGY & ADDITIONAL STUDIES:

## 2021-01-27 NOTE — H&P ADULT - PROBLEM SELECTOR PLAN 8
- per nursing home record, pt on mirilax BID, senna 2 tabs at bedtime, docusate 300 mg QHS, lactulose 30 ml BID, and enemas prn  - c/w home meds

## 2021-01-27 NOTE — ED PROVIDER NOTE - PHYSICAL EXAMINATION
GENERAL: alert, NAD.    HEAD: NCAT  EYE: conj pale  ENT:  pale, cracked, dry lips.  pale oral mucosa.  MM dry  CV:  tachy regular S1S2, +2 syst murmur  PULM:  normal resp rate, normal work of breathing, CTAB  GI: abd soft.  rectal: ? melena on [place] (sent for guaiac test)  : [place], no drainage  SKIN: decreased turgor  MSK:  sacral and heel ulcers present  NEURO: alert, oriented to hospital, "2021"  speech soft but clear.  quadriplegic.

## 2021-01-27 NOTE — ED PROVIDER NOTE - CARE PLAN
Principal Discharge DX:	Severe anemia   Principal Discharge DX:	Severe anemia  Secondary Diagnosis:	Melena  Secondary Diagnosis:	Chest pain  Secondary Diagnosis:	Hypoxia

## 2021-01-27 NOTE — CONSULT NOTE ADULT - SUBJECTIVE AND OBJECTIVE BOX
Patient is a 55y old  Male who presents with a chief complaint of anemia    HPI:  Pt is a 56 y/o male, poor historian w/ pmhx of MS, quadriplegia, HTN, HLD, bipolar disorder, sicca syndrome, polyneuropathy presenting from nursing home with Hb of 3.6. Pt reports complaints of intermittent chest tightness and SOB for past few days. He states the chest tightness is across his chest, non radiating. No exacerbating or improving factors. He states he is unaware if he has had any black or bloody stools. Denies n/v or hemoptysis. Also complains of left sided abd pain, fever and chills while at nursing home. Pt denies hx of GI bleed, states that he does not think that he takes any nsaids.     Allergies    No Known Allergies    Intolerances      Home Medications:      SOCIAL HX:     Smoking: nonsmoker         ETOH/Illicit drugs: no alcohol or illicit drugs         Occupation    PAST MEDICAL & SURGICAL HISTORY:      FAMILY HISTORY:  :    No known cardiovascular or pulmonary family history     ROS:  See HPI     PHYSICAL EXAM    ICU Vital Signs Last 24 Hrs  T(C): 37.4 (27 Jan 2021 02:45), Max: 37.9 (27 Jan 2021 02:24)  T(F): 99.3 (27 Jan 2021 02:45), Max: 100.2 (27 Jan 2021 02:24)  HR: 90 (27 Jan 2021 02:45) (90 - 110)  BP: 123/64 (27 Jan 2021 02:45) (122/66 - 145/81)  BP(mean): --  ABP: --  ABP(mean): --  RR: 16 (26 Jan 2021 23:50) (16 - 16)  SpO2: 100% (27 Jan 2021 02:45) (90% - 100%)      General: NAD, resting comfortably in bed  HEENT: NC/AT; anicteric sclera; dry mucous membranes  Neck: supple  Cardiovascular: +S1/S2; RRR  Respiratory: CTA B/L; no W/R/R  Gastrointestinal: soft, NT/ND; +BSx4  Extremities: WWP; no edema, clubbing or cyanosis  Vascular: 2+ radial, DP/PT pulses B/L  Neurological: AAOx3, quadriplegic, b/l UE contracted.       LABS:                         3.5    8.99  )-----------( 229      ( 27 Jan 2021 00:33 )             13.1     01-27    145  |  110<H>  |  15  ----------------------------<  106<H>  3.8   |  26  |  0.81    Ca    7.8<L>      27 Jan 2021 00:33  Mg     2.2     01-27    TPro  6.1  /  Alb  2.7<L>  /  TBili  0.3  /  DBili  x   /  AST  16  /  ALT  9<L>  /  AlkPhos  54  01-27    PT/INR - ( 27 Jan 2021 00:33 )   PT: 13.6 sec;   INR: 1.14          PTT - ( 27 Jan 2021 00:33 )  PTT:25.8 sec    CARDIAC MARKERS ( 27 Jan 2021 00:33 )  x     / 0.03 ng/mL / x     / x     / x            Lactate, Blood: 1.3 mmol/L (01-27 @ 00:33)      RADIOLOGY, EKG & ADDITIONAL TESTS: Reviewed.        Patient is a 55y old  Male who presents with a chief complaint of anemia    HPI:  Pt is a 54 y/o male, poor historian w/ pmhx of MS, quadriplegia, HTN, HLD, bipolar disorder, sicca syndrome, polyneuropathy, chronic pain, pressure ulcers, mrsa infections, presenting from nursing home with Hb of 3.6. Pt reports complaints of intermittent chest tightness and SOB for past few days. He states the chest tightness is across his chest, non radiating. No exacerbating or improving factors. He states he is unaware if he has had any black or bloody stools. Denies n/v or hemoptysis. Also complains of left sided abd pain, fever and chills while at nursing home. Pt denies hx of GI bleed, states that he does not think that he takes any nsaids. Per nursing home notes, pt was recently started on vanc and zosyn 1/25 for fever. On further inquiry from patient, he states they started him on abx because he had a fever of unclear source.    In ED, vitals: T 98.4, , /81, RR 16, O2 sat 95% on RA --> 90% RA, placed on 5 L NC --> 98%.   Labs significant for Hb/hct 3.5/13.1, troponin 0.03  EKG: sinus tachycardia with ST depressions in I, avL, v2, v4-v6, no prior EKG to compare  CXR: no acute infiltrates   In ED, Pt received 1 L NS, 2 units of prbc and started on PPI gtt. GI was also consulted.     Allergies    No Known Allergies    Intolerances      Home Medications:      SOCIAL HX:     Smoking: nonsmoker         ETOH/Illicit drugs: no alcohol or illicit drugs         Occupation    PAST MEDICAL & SURGICAL HISTORY:      FAMILY HISTORY:  :    No known cardiovascular or pulmonary family history     ROS:  See HPI     PHYSICAL EXAM    ICU Vital Signs Last 24 Hrs  T(C): 37.4 (27 Jan 2021 02:45), Max: 37.9 (27 Jan 2021 02:24)  T(F): 99.3 (27 Jan 2021 02:45), Max: 100.2 (27 Jan 2021 02:24)  HR: 90 (27 Jan 2021 02:45) (90 - 110)  BP: 123/64 (27 Jan 2021 02:45) (122/66 - 145/81)  BP(mean): --  ABP: --  ABP(mean): --  RR: 16 (26 Jan 2021 23:50) (16 - 16)  SpO2: 100% (27 Jan 2021 02:45) (90% - 100%)      General: NAD, resting comfortably in bed  HEENT: NC/AT; anicteric sclera; dry mucous membranes; pallor  Neck: supple  Cardiovascular: +S1/S2; RRR; 2/6 systolic murmur  Respiratory: CTA B/L; no W/R/R  Gastrointestinal: soft, NT/ND; +BSx4  Extremities: WWP; no edema, clubbing or cyanosis. Left upper extremity with picc line.   Vascular: 2+ radial, DP/PT pulses B/L  Neurological: AAOx3, quadriplegic, b/l UE contracted.       LABS:                         3.5    8.99  )-----------( 229      ( 27 Jan 2021 00:33 )             13.1     01-27    145  |  110<H>  |  15  ----------------------------<  106<H>  3.8   |  26  |  0.81    Ca    7.8<L>      27 Jan 2021 00:33  Mg     2.2     01-27    TPro  6.1  /  Alb  2.7<L>  /  TBili  0.3  /  DBili  x   /  AST  16  /  ALT  9<L>  /  AlkPhos  54  01-27    PT/INR - ( 27 Jan 2021 00:33 )   PT: 13.6 sec;   INR: 1.14          PTT - ( 27 Jan 2021 00:33 )  PTT:25.8 sec    CARDIAC MARKERS ( 27 Jan 2021 00:33 )  x     / 0.03 ng/mL / x     / x     / x            Lactate, Blood: 1.3 mmol/L (01-27 @ 00:33)      RADIOLOGY, EKG & ADDITIONAL TESTS: Reviewed.        Patient is a 55y old  Male who presents with a chief complaint of anemia    HPI:  Pt is a 54 y/o male, poor historian w/ pmhx of MS, quadriplegia, iron deficiency anemia, HTN, HLD, bipolar disorder, sicca syndrome, polyneuropathy, chronic pain, pressure ulcers, mrsa infections, presenting from nursing home with Hb of 3.6. Pt reports complaints of intermittent chest tightness and SOB for past few days. He states the chest tightness is across his chest, non radiating. No exacerbating or improving factors. He states he is unaware if he has had any black or bloody stools. Denies n/v or hemoptysis. Also complains of left sided abd pain, fever and chills while at nursing home. Pt denies hx of GI bleed, states that he does not think that he takes any nsaids. Per nursing home notes, pt was recently started on vanc and zosyn 1/25 for fever. On further inquiry from patient, he states they started him on abx because he had a fever of unclear source.    In ED, vitals: T 98.4, , /81, RR 16, O2 sat 95% on RA --> 90% RA, placed on 5 L NC --> 98%.   Labs significant for Hb/hct 3.5/13.1, troponin 0.03  EKG: sinus tachycardia with ST depressions in I, avL, v2, v4-v6, no prior EKG to compare  CXR: no acute infiltrates   In ED, Pt received 1 L NS, 2 units of prbc and started on PPI gtt. GI was also consulted.     Allergies    No Known Allergies    Intolerances      Home Medications:      SOCIAL HX:     Smoking: nonsmoker         ETOH/Illicit drugs: no alcohol or illicit drugs         Occupation    PAST MEDICAL & SURGICAL HISTORY:      FAMILY HISTORY:  :    No known cardiovascular or pulmonary family history     ROS:  See HPI     PHYSICAL EXAM    ICU Vital Signs Last 24 Hrs  T(C): 37.4 (27 Jan 2021 02:45), Max: 37.9 (27 Jan 2021 02:24)  T(F): 99.3 (27 Jan 2021 02:45), Max: 100.2 (27 Jan 2021 02:24)  HR: 90 (27 Jan 2021 02:45) (90 - 110)  BP: 123/64 (27 Jan 2021 02:45) (122/66 - 145/81)  BP(mean): --  ABP: --  ABP(mean): --  RR: 16 (26 Jan 2021 23:50) (16 - 16)  SpO2: 100% (27 Jan 2021 02:45) (90% - 100%)      General: NAD, resting comfortably in bed  HEENT: NC/AT; anicteric sclera; dry mucous membranes; pallor  Neck: supple  Cardiovascular: +S1/S2; RRR; 2/6 systolic murmur  Respiratory: CTA B/L; no W/R/R  Gastrointestinal: soft, NT/ND; +BSx4  Extremities: WWP; no edema, clubbing or cyanosis. Left upper extremity with picc line.   Vascular: 2+ radial, DP/PT pulses B/L  Neurological: AAOx3, quadriplegic, b/l UE contracted.       LABS:                         3.5    8.99  )-----------( 229      ( 27 Jan 2021 00:33 )             13.1     01-27    145  |  110<H>  |  15  ----------------------------<  106<H>  3.8   |  26  |  0.81    Ca    7.8<L>      27 Jan 2021 00:33  Mg     2.2     01-27    TPro  6.1  /  Alb  2.7<L>  /  TBili  0.3  /  DBili  x   /  AST  16  /  ALT  9<L>  /  AlkPhos  54  01-27    PT/INR - ( 27 Jan 2021 00:33 )   PT: 13.6 sec;   INR: 1.14          PTT - ( 27 Jan 2021 00:33 )  PTT:25.8 sec    CARDIAC MARKERS ( 27 Jan 2021 00:33 )  x     / 0.03 ng/mL / x     / x     / x            Lactate, Blood: 1.3 mmol/L (01-27 @ 00:33)      RADIOLOGY, EKG & ADDITIONAL TESTS: Reviewed.

## 2021-01-27 NOTE — PROGRESS NOTE ADULT - PROBLEM SELECTOR PLAN 6
- pt c/o of SOB and chest tightness likely 2/2 to anemia  - currently on NC, wean as tolerated  - CXR w/o acute infiltrates.   - low suspicion for covid pna or PE, f/u result. D-dimer wnl - per nursing home record, pt on mirilax BID, senna 2 tabs at bedtime, docusate 300 mg QHS, lactulose 30 ml BID, and enemas prn  - c/w home meds

## 2021-01-27 NOTE — ED PROVIDER NOTE - CLINICAL SUMMARY MEDICAL DECISION MAKING FREE TEXT BOX
Severe anemia, unclear source.  Transfer notes list fe def and anemia of renal disease, but possibly has melena on [place] which will be tested for occult blood.  Admits to chest tightness, and EKG with ischemic changes.  Patient consented for PRBC transfusion, will initial while labs pending. Severe anemia, unclear source.  Transfer notes list fe def and anemia of renal disease, but possibly has melena on [place] which will be tested for occult blood.  Admits to chest tightness, and EKG with tachycardia and ischemic changes; O2 sats in mid 80s-90% room air, increased to 95% on supplemental oxygen at 5 LPM.  Patient consented for PRBC transfusion, will initiate transfusion while labs pending.

## 2021-01-27 NOTE — PROGRESS NOTE ADULT - PROBLEM SELECTOR PLAN 5
- pt with hx of MS, quadriplegia, chronic pain.  - per nursing home records, pt is on MS contin 15 ER 1 tab q8h.  - per pt, he has a baclofen pump. Obtain collateral.    #Polyneuropathy  - c/w pregablin 50 mg TID    #neuromuscular dysfunction of bladder  - c/w oxybutynin 5 mg BID - per nursing home records, pt on metoprolol tartrate 100 mg BID  - holding in setting of GI bleed    #HLD  - c/w atorvastatin 20 mg QHS

## 2021-01-27 NOTE — PROGRESS NOTE ADULT - PROBLEM SELECTOR PLAN 2
Possible upper GI bleed   - Per ED rectal exam, ?melena, black secretions. positive FOBT.  - Hb 3.5, s/p 2 units of prbc. Transfuse to Hb >7  - GI consulted  - c/w PPI gtt and keep NPO  - maintain active type and screens  - maintain IV access Possible upper GI bleed   - Per ED rectal exam, ?melena, black secretions. positive FOBT.  - Transfused as above  - GI consulted- will plan 1/28 for colonoscopy + endoscopy   - c/w PPI gtt and keep NPO  - maintain active type and screen  - maintain IV access

## 2021-01-27 NOTE — PROGRESS NOTE ADULT - ASSESSMENT
Pt is a 54 y/o male, poor historian w/ pmhx of MS, quadriplegia, iron deficiency anemia, HTN, HLD, bipolar disorder, sicca syndrome, polyneuropathy, chronic pain, pressure ulcer, mrsa infection, presenting from nursing home with Hb of 3.6. Pt admitted for severe anemia likely 2/2 to possible upper GI bleed.  Pt is a 54 y/o male, poor historian w/ pmhx of MS, quadriplegia, iron deficiency anemia, HTN, HLD, bipolar disorder, sicca syndrome, polyneuropathy, chronic pain, pressure ulcer, mrsa infection, presenting from nursing home with Hb of 3.6. Pt admitted for severe anemia 2/2 presumed GI bleed, w/ GI planning for colonoscopy + endoscopy 1/28.

## 2021-01-27 NOTE — H&P ADULT - NSICDXPASTMEDICALHX_GEN_ALL_CORE_FT
PAST MEDICAL HISTORY:  Bipolar disorder     Hypertension     Iron deficiency anemia     MRSA infection     Multiple sclerosis

## 2021-01-27 NOTE — PROGRESS NOTE ADULT - PROBLEM SELECTOR PLAN 9
F: s/p 3U PRBC  E: replete PRN  N: NPO for possible EGD    DVT ppx: SCDs, hold lovenox in setting of r/o GIB  Dispo: [facility] F: s/p 4U PRBC  E: replete PRN  N: NPO for EGD/colonoscopy     DVT ppx: SCDs, hold lovenox in setting of r/o GIB  Dispo:   FULL CODE F: s/p 4U PRBC; 1/2 normal saline at 80 cc/hr for 10 hr  E: replete PRN  N: NPO for EGD/colonoscopy     DVT ppx: SCDs, hold lovenox in setting of r/o GIB  Dispo:   FULL CODE

## 2021-01-27 NOTE — CONSULT NOTE ADULT - ASSESSMENT
Pt is a 56 y/o male, poor historian w/ pmhx of MS, quadriplegia, HTN, HLD, bipolar disorder, sicca syndrome, polyneuropathy, chronic pain, pressure ulcer, mrsa infection, presenting from nursing home with Hb of 3.6. Pt admitted for severe anemia likely 2/2 to upper GI bleed.     Neuro  - A&Ox3  #MS  - pt with hx of MS, quadriplegia, chronic pain.  - per nursing home records, pt is on MS contin 15 ER 1 tab q8h.  - per pt, he has a baclofen pump. Obtain collateral.    #Polyneuropathy  - c/w pregablin 50 mg TID    Pulmonary  #Acute hypoxic respiratory failure  - pt c/o of SOB and chest tightness likely 2/2 to anemia  - currently on NC, wean as tolerated  - CXR w/o acute infiltrates.   - low suspicion for covid pna or PE, f/u result. D-dimer wnl.    Cardiovascular  #r/o ACS  - pt complaining of chest tightness. EKG with ST depressions in I, avL, v2, v4-v6 with elevated troponin 0.03. No prior EKG to compare  - troponin likely elevated 2/2 to demand ischemia in the setting of severe anemia  - f/u repeat EKG and troponin    #HLD  - c/w atorvastatin 20 mg QHS    GI  #Upper GI bleed  - Per ED rectal exam, ?melena. Pt with positive FOBT.  - Hb 3.5, s/p 2 units of prbc. Transfuse to Hb >7  - GI consulted  - c/w PPI gtt and keep NPO    Renal  - no issues    Heme  #Anemia  - Hb 3.5, likely 2/2 to upper GI bleed  - Transfuse Hb >7  - check fibrinogen   - check CBC q4h after transfusion    ID  #Fever  - pt reports fever at nursing home, however afebrile in ED.  - per nursing home records, pt was started on vanc 750 q12h and zosyn 3.375 g q8h for fever. Unclear source.  - Obtain bcx, ucx, picc line cx  - check procalcitonin  - continue with vanc and zosyn  - place on mrsa contact until cx returns.   - obtain collateral in AM from nursing about possible infection and abx course.    Endo  - no issues    Dispo: Medicine telemetry   Pt is a 54 y/o male, poor historian w/ pmhx of MS, quadriplegia, iron deficiency anemia, HTN, HLD, bipolar disorder, sicca syndrome, polyneuropathy, chronic pain, pressure ulcer, mrsa infection, presenting from nursing home with Hb of 3.6. Pt admitted for severe anemia likely 2/2 to upper GI bleed.     Neuro  - A&Ox3  #MS  - pt with hx of MS, quadriplegia, chronic pain.  - per nursing home records, pt is on MS contin 15 ER 1 tab q8h.  - per pt, he has a baclofen pump. Obtain collateral.    #Polyneuropathy  - c/w pregablin 50 mg TID    Pulmonary  #Acute hypoxic respiratory failure  - pt c/o of SOB and chest tightness likely 2/2 to anemia  - currently on NC, wean as tolerated  - CXR w/o acute infiltrates.   - low suspicion for covid pna or PE, f/u result. D-dimer wnl.    Cardiovascular  #r/o ACS  - pt complaining of chest tightness. EKG with ST depressions in I, avL, v2, v4-v6 with elevated troponin 0.03. No prior EKG to compare  - troponin likely elevated 2/2 to demand ischemia in the setting of severe anemia  - f/u repeat EKG and troponin    #HTN  - per nursing home records, pt on metoprolol tartrate 100 mg BID  - hold in setting of GI bleed    #HLD  - c/w atorvastatin 20 mg QHS    GI  #Upper GI bleed  - Per ED rectal exam, ?melena. Pt with positive FOBT.  - Hb 3.5, s/p 2 units of prbc. Transfuse to Hb >7  - GI consulted  - c/w PPI gtt and keep NPO    #Constipation  - per nursing home record, pt on mirilax BID, senna 2 tabs at bedtime, docusate 300 mg QHS, lactulose 30 ml BID, and enemas prn.      Renal  - no issues    Heme  #Anemia  - Hb 3.5, likely 2/2 to upper GI bleed  - Transfuse Hb >7  - check iron panel, B12 and folate  - check fibrinogen   - check CBC q4h after transfusion    ID  #Fever  - pt reports fever at nursing home, however afebrile in ED.  - per nursing home records, pt was started on vanc 750 q12h and zosyn 3.375 g q8h for fever. Unclear source.  - Obtain bcx, ucx, LUE picc line cx  - check procalcitonin  - continue with vanc and zosyn  - place on mrsa contact until cx returns.   - obtain collateral in AM from nursing about possible infection and abx course.      #neuromuscular dysfunction of bladder  - c/w oxybutynin 5 mg BID    Endo  - no issues    Dispo: Medicine telemetry   Pt is a 54 y/o male, poor historian w/ pmhx of MS, quadriplegia, iron deficiency anemia, HTN, HLD, bipolar disorder, sicca syndrome, polyneuropathy, chronic pain, pressure ulcer, mrsa infection, presenting from nursing home with Hb of 3.6. Pt admitted for severe anemia likely 2/2 to possible upper GI bleed.     Neuro  - A&Ox3    #MS  - pt with hx of MS, quadriplegia, chronic pain.  - per nursing home records, pt is on MS contin 15 ER 1 tab q8h.  - per pt, he has a baclofen pump. Obtain collateral.    #Polyneuropathy  - c/w pregablin 50 mg TID    Pulmonary  #Acute hypoxic respiratory failure  - pt c/o of SOB and chest tightness likely 2/2 to anemia  - currently on NC, wean as tolerated  - CXR w/o acute infiltrates.   - low suspicion for covid pna or PE, f/u result. D-dimer wnl.    Cardiovascular  #r/o ACS  - pt complaining of chest tightness. EKG with ST depressions in I, avL, v2, v4-v6 with elevated troponin 0.03. No prior EKG to compare  - troponin likely elevated 2/2 to demand ischemia in the setting of severe anemia  - f/u repeat EKG and troponin    #HTN  - per nursing home records, pt on metoprolol tartrate 100 mg BID  - hold in setting of GI bleed    #HLD  - c/w atorvastatin 20 mg QHS    GI  #Possible upper GI bleed  - Per ED rectal exam, ?melena, black secretions. positive FOBT.  - Hb 3.5, s/p 2 units of prbc. Transfuse to Hb >7  - GI consulted  - c/w PPI gtt and keep NPO    #Constipation  - per nursing home record, pt on mirilax BID, senna 2 tabs at bedtime, docusate 300 mg QHS, lactulose 30 ml BID, and enemas prn.      Renal  - no issues    Heme  #Anemia  - Hb 3.5, likely 2/2 to upper GI bleed  - Transfuse Hb >7  - check iron panel, B12 and folate  - check fibrinogen   - check CBC q4h after transfusion    ID  #Fever  - pt reports fever at nursing home, however afebrile in ED.  - per nursing home records, pt was started on vanc 750 q12h and zosyn 3.375 g q8h for fever. Unclear source.  - Obtain bcx, ucx, LUE picc line cx  - check procalcitonin  - continue with vanc and zosyn  - place on mrsa contact until cx returns.   - obtain collateral in AM from nursing about possible infection and abx course.      #neuromuscular dysfunction of bladder  - c/w oxybutynin 5 mg BID    Endo  - no issues    Dispo: Medicine telemetry   Pt is a 54 y/o male, poor historian w/ pmhx of MS, quadriplegia, iron deficiency anemia, HTN, HLD, bipolar disorder, sicca syndrome, polyneuropathy, chronic pain, pressure ulcer, mrsa infection, presenting from nursing home with Hb of 3.6. Pt admitted for severe anemia likely 2/2 to possible upper GI bleed.     Neuro  - A&Ox3    #MS  - pt with hx of MS, quadriplegia, chronic pain.  - per nursing home records, pt is on MS contin 15 ER 1 tab q8h.  - per pt, he has a baclofen pump. Obtain collateral.    #Polyneuropathy  - c/w pregablin 50 mg TID    Pulmonary  #Acute hypoxic respiratory failure  - pt c/o of SOB and chest tightness likely 2/2 to anemia  - currently on NC, wean as tolerated  - CXR w/o acute infiltrates.   - low suspicion for covid pna or PE, f/u result. D-dimer wnl.    Cardiovascular  #r/o ACS  - pt complaining of chest tightness. EKG with ST depressions in I, avL, v2, v4-v6 with elevated troponin 0.03. No prior EKG to compare  - troponin likely elevated 2/2 to demand ischemia in the setting of severe anemia  - f/u repeat EKG and troponin    #HTN  - per nursing home records, pt on metoprolol tartrate 100 mg BID  - hold in setting of GI bleed    #HLD  - c/w atorvastatin 20 mg QHS    GI  #R/o upper GI bleed  - Per ED rectal exam, ?melena, black secretions. positive FOBT. Per nursing home note, pt not on any nsaids or AC.   - Hb 3.5, s/p 2 units of prbc. Transfuse to Hb >7  - GI consulted  - c/w PPI gtt and keep NPO    #Constipation  - per nursing home record, pt on mirilax BID, senna 2 tabs at bedtime, docusate 300 mg QHS, lactulose 30 ml BID, and enemas prn.      Renal  - no issues    Heme  #Anemia  - Hb 3.5, likely 2/2 to upper GI bleed  - Transfuse Hb >7  - check iron panel, B12 and folate  - check fibrinogen   - check CBC q4h after transfusion    ID  #Fever  - pt reports fever at nursing home, however afebrile in ED.  - per nursing home records, pt was started on vanc 750 q12h and zosyn 3.375 g q8h for fever. Unclear source.  - Obtain bcx, ucx, LUE picc line cx  - check procalcitonin  - continue with vanc and zosyn  - place on mrsa contact until cx returns.   - obtain collateral in AM from nursing about possible infection and abx course.      #neuromuscular dysfunction of bladder  - c/w oxybutynin 5 mg BID    Endo  - no issues    Dispo: Medicine telemetry   Pt is a 54 y/o male, poor historian w/ pmhx of MS, quadriplegia, iron deficiency anemia, HTN, HLD, bipolar disorder, sicca syndrome, polyneuropathy, chronic pain, pressure ulcer, mrsa infection, presenting from nursing home with Hb of 3.6. Pt admitted for severe anemia likely 2/2 to possible upper GI bleed.     Neuro  - A&Ox3    #MS  - pt with hx of MS, quadriplegia, chronic pain.  - per nursing home records, pt is on MS contin 15 ER 1 tab q8h.  - per pt, he has a baclofen pump. Obtain collateral.    #Polyneuropathy  - c/w pregablin 50 mg TID    Pulmonary  #Acute hypoxic respiratory failure  - pt c/o of SOB and chest tightness likely 2/2 to anemia  - currently on NC, wean as tolerated  - CXR w/o acute infiltrates.   - low suspicion for covid pna or PE, f/u result. D-dimer wnl.    Cardiovascular  #r/o ACS  - pt complaining of chest tightness. EKG with ST depressions in I, avL, v2, v4-v6 with elevated troponin 0.03. No prior EKG to compare  - troponin likely elevated 2/2 to demand ischemia in the setting of severe anemia  - f/u repeat EKG and troponin    #HTN  - per nursing home records, pt on metoprolol tartrate 100 mg BID  - hold in setting of GI bleed    #HLD  - c/w atorvastatin 20 mg QHS    GI  #R/o upper GI bleed  - Per ED rectal exam, ?melena, black secretions. positive FOBT. Per nursing home note, pt not on any nsaids or AC.   - Hb 3.5, s/p 2 units of prbc. Transfuse to Hb >7  - GI consulted  - c/w PPI gtt and keep NPO    #Constipation  - per nursing home record, pt on mirilax BID, senna 2 tabs at bedtime, docusate 300 mg QHS, lactulose 30 ml BID, and enemas prn.      Renal  - no issues    Heme  #Anemia  - Hb 3.5, likely 2/2 to upper GI bleed  - Transfuse Hb >7  - check iron panel, B12 and folate  - check fibrinogen   - check CBC q4h after transfusion    ID  #Fever  - pt reports fever at nursing home, however afebrile in ED.  - per nursing home records, pt was started on vanc 750 q12h and zosyn 3.375 g q8h for fever. Unclear source.  - Obtain bcx, ucx, LUE picc line cx  - check procalcitonin  - continue with vanc and zosyn  - place on mrsa contact until cx returns. Pt w/ past hx of mrsa infections, has known pressure ulcers.  - obtain collateral in AM from nursing about possible infection and abx course.      #neuromuscular dysfunction of bladder  - c/w oxybutynin 5 mg BID    Endo  - no issues    Dispo: Medicine telemetry

## 2021-01-27 NOTE — CONSULT NOTE ADULT - ASSESSMENT
56 y/o male, poor historian w/ pmhx of MS, quadriplegia, iron deficiency anemia, HTN, HLD, bipolar disorder, sicca syndrome, polyneuropathy, chronic pain, pressure ulcers, mrsa infections, presenting from nursing home with Hb of 3.6.    Symptomatic anemia  Hemoglobin 3.6 on admission, VSS; f/u post transfusion cbc  Brown stool on rectal exam - no signs of active bleeding  Recommend EGD, colonoscopy to further evaluate and r/o slow occult GIB  56 y/o male, poor historian w/ pmhx of MS, quadriplegia, iron deficiency anemia, HTN, HLD, bipolar disorder, sicca syndrome, polyneuropathy, chronic pain, pressure ulcers, mrsa infections, presenting from nursing home with Hb of 3.6.    Symptomatic anemia  Hemoglobin 3.6 on admission, VSS; f/u post transfusion cbc  Brown stool on rectal exam - no signs of active bleeding  On PPI  Recommend EGD, colonoscopy to further evaluate and r/o slow occult GIB  54 y/o male, poor historian w/ pmhx of MS, quadriplegia, iron deficiency anemia, HTN, HLD, bipolar disorder, sicca syndrome, polyneuropathy, chronic pain, pressure ulcers, mrsa infections, presenting from nursing home with Hb of 3.6.    Symptomatic anemia  Hemoglobin 3.6 on admission, FOBT+  Patient is hemodynamically stable and no signs of active GIB (Brown stool on rectal exam)  Recommend EGD, colonoscopy to further evaluate and r/o slow occult GIB ( differential : esophagitis, gastritis, PUD, AVM, malignancy)  Clear liquid diet today- drink 2 L moviprep starting at 5 pm tonight, to be completed by 6 am tomorrow, plan for 9 am procedure    54 y/o male, poor historian w/ pmhx of MS, quadriplegia, iron deficiency anemia, HTN, HLD, bipolar disorder, sicca syndrome, polyneuropathy, chronic pain, pressure ulcers, mrsa infections, presenting from nursing home with Hb of 3.6.    Symptomatic anemia  Hemoglobin 3.6 on admission, FOBT+, iron studies c/w PERI;  Patient is hemodynamically stable and no signs of active GIB (Brown stool on rectal exam).  follow up post transfusion cbc   Recommend EGD, colonoscopy to further evaluate and r/o slow occult GIB ( differential : esophagitis, gastritis, PUD, AVM, malignancy)  Clear liquid diet today- drink 2 L moviprep starting at 5 pm tonight, to be completed by 6 am tomorrow, plan for 9 am procedure

## 2021-01-27 NOTE — H&P ADULT - PROBLEM SELECTOR PLAN 6
- pt c/o of SOB and chest tightness likely 2/2 to anemia  - currently on NC, wean as tolerated  - CXR w/o acute infiltrates.   - low suspicion for covid pna or PE, f/u result. D-dimer wnl

## 2021-01-27 NOTE — H&P ADULT - PROBLEM SELECTOR PLAN 7
- per nursing home records, pt on metoprolol tartrate 100 mg BID  - hold in setting of GI bleed    #HLD  - c/w atorvastatin 20 mg QHS

## 2021-01-27 NOTE — PROGRESS NOTE ADULT - PROBLEM SELECTOR PLAN 8
- per nursing home record, pt on mirilax BID, senna 2 tabs at bedtime, docusate 300 mg QHS, lactulose 30 ml BID, and enemas prn  - c/w home meds RESOLVED  -Initially c/o CP on adm, w/ EKG with ST depressions in I, avL, v2, v4-v6 with elevated troponin 0.03. No prior EKG to compare  - troponin thought likely elevated 2/2 to demand ischemia in the setting of severe anemia, plateaued on repeat.  -Pt's CP significantly improved/resolved following blood transfusions

## 2021-01-27 NOTE — H&P ADULT - PROBLEM SELECTOR PLAN 3
Pt reports fever at nursing home, however afebrile in ED (Tmax of 100.2). States that he was started on antibiotics for unknown origin, was pancultured at NH  - per nursing home records, pt was started on vanc 750 q12h and zosyn 3.375 g q8h for fever. Unclear source.  - f/u bcx, ucx, LUE picc line cx  - f/u procalcitonin  - f/u vanc trough (patient received 3 doses of vanc at NH)  - restart vanc and zosyn  - place on MRSA contact until cx returns given hx of MRSA bacteremia  - obtain collateral in AM from nursing about possible infection and abx course Pt reports fever at nursing home, however afebrile in ED (Tmax of 100.2). States that he was started on antibiotics for unknown origin, was pancultured at NH. CXR demonstrates R hilar ?infiltrate. Patient without cough  - per nursing home records, pt was started on vanc 750 q12h and zosyn 3.375 g q8h for fever. Unclear source.  - f/u bcx, ucx, LUE picc line cx  - f/u procalcitonin  - f/u vanc trough (patient received 3 doses of vanc at NH)  - restart vanc and zosyn  - place on MRSA contact until cx returns given hx of MRSA bacteremia  - obtain collateral in AM from nursing about possible infection and abx course

## 2021-01-27 NOTE — H&P ADULT - NSHPPHYSICALEXAM_GEN_ALL_CORE
VITAL SIGNS:  Vital Signs Last 24 Hrs  T(C): 36.9 (27 Jan 2021 04:25), Max: 37.9 (27 Jan 2021 02:24)  T(F): 98.5 (27 Jan 2021 04:25), Max: 100.2 (27 Jan 2021 02:24)  HR: 89 (27 Jan 2021 04:25) (83 - 110)  BP: 123/60 (27 Jan 2021 04:25) (122/66 - 145/81)  BP(mean): --  RR: 16 (26 Jan 2021 23:50) (16 - 16)  SpO2: 98% (27 Jan 2021 04:25) (90% - 100%)  I&O's Summary      PHYSICAL EXAM:    General: WDWN  HEENT: NC/AT; PERRL, anicteric sclera; MMM  Neck: supple  Cardiovascular: +S1/S2; RRR  Respiratory: CTA B/L; no W/R/R  Gastrointestinal: soft, NT/ND; +BSx4  Extremities: WWP; no edema, clubbing or cyanosis  Vascular: 2+ radial, DP/PT pulses B/L  Neurological: AAOx3; no focal deficits VITAL SIGNS:  Vital Signs Last 24 Hrs  T(C): 36.9 (27 Jan 2021 04:25), Max: 37.9 (27 Jan 2021 02:24)  T(F): 98.5 (27 Jan 2021 04:25), Max: 100.2 (27 Jan 2021 02:24)  HR: 89 (27 Jan 2021 04:25) (83 - 110)  BP: 123/60 (27 Jan 2021 04:25) (122/66 - 145/81)  BP(mean): --  RR: 16 (26 Jan 2021 23:50) (16 - 16)  SpO2: 98% (27 Jan 2021 04:25) (90% - 100%)  I&O's Summary      PHYSICAL EXAM:    General: laying supine, NAD  HEENT: NC/AT; PERRL, anicteric sclera; MMM  Cardiovascular: +S1/S2; RRR  Respiratory: CTA B/L  Gastrointestinal: soft, NT/ND; +BSx4, surgical scar present ?baclofen pump palpable  Extremities: R upper extremity contracted, WWP; 1+ pitting edema b/l  Vascular: 2+ radial, DP/PT pulses B/L  Neurological: AAOx3, functionally quadriplegic, otherwise refusing to participate in exam

## 2021-01-27 NOTE — H&P ADULT - PROBLEM SELECTOR PLAN 4
- pt complaining of chest tightness. EKG with ST depressions in I, avL, v2, v4-v6 with elevated troponin 0.03. No prior EKG to compare  - troponin likely elevated 2/2 to demand ischemia in the setting of severe anemia  - f/u repeat EKG and troponin

## 2021-01-27 NOTE — PROGRESS NOTE ADULT - PROBLEM SELECTOR PLAN 7
- per nursing home records, pt on metoprolol tartrate 100 mg BID  - hold in setting of GI bleed    #HLD  - c/w atorvastatin 20 mg QHS RESOLVED   - pt c/o of SOB and chest tightness likely 2/2 to anemia  - Was on NC, weaned to RA, satting high 90s-100%.   - CXR w/o acute infiltrates.   - low suspicion for covid pna or PE, f/u result. D-dimer wnl

## 2021-01-27 NOTE — ED ADULT NURSE NOTE - NSIMPLEMENTINTERV_GEN_ALL_ED
Implemented All Fall Risk Interventions:  [place] to call system. Call bell, personal items and telephone within reach. Instruct patient to call for assistance. Room bathroom lighting operational. Non-slip footwear when patient is off stretcher. Physically safe environment: no spills, clutter or unnecessary equipment. Stretcher in lowest position, wheels locked, appropriate side rails in place. Provide visual cue, wrist band, yellow gown, etc. Monitor gait and stability. Monitor for mental status changes and reorient to person, place, and time. Review medications for side effects contributing to fall risk. Reinforce activity limits and safety measures with patient and family.

## 2021-01-27 NOTE — PROGRESS NOTE ADULT - SUBJECTIVE AND OBJECTIVE BOX
OVERNIGHT EVENTS: Admitted, given 4U PRBC with appropriate rise in hb to 8.     SUBJECTIVE / INTERVAL HPI: Patient seen and examined at bedside.   This am, felt better than he did on adm. Said that CP was significantly improved and wasn't really bothering him anymore. Denied f/c/sob/palp/abd pain/n/v/changes in bowel/bladder habits.     VITAL SIGNS:  Vital Signs Last 24 Hrs  T(C): 37.1 (2021 13:32), Max: 37.9 (2021 02:24)  T(F): 98.8 (2021 13:32), Max: 100.2 (2021 02:24)  HR: 92 (2021 12:07) (83 - 110)  BP: 146/77 (2021 12:07) (113/67 - 146/77)  BP(mean): 106 (2021 12:07) (106 - 106)  RR: 16 (2021 12:07) (15 - 18)  SpO2: 100% (2021 12:07) (90% - 100%)    PHYSICAL EXAM:  General: laying supine, NAD  HEENT: NC/AT; PERRL, anicteric sclera; MMM, interestingly no conjunctival pallor  Cardiovascular: +S1/S2; RRR  Respiratory: Inspiratory effort/excursions not great, but no crackles/wheezes, rhonchi heard.   Gastrointestinal: soft, NT/ND; +BSx4, surgical scar present baclofen pump palpable in Rside of abd adjacent to scar  Extremities: R upper extremity contracted, extremities cool but not cold; 1+ pitting edema b/l up to knee.   Vascular: 2+ radial, DP/PT pulses B/L  Neurological: AAOx3, functionally quadriplegic; Pt not participating in strength assessment as he said that he wouldn't be able to do it if he tried. Sensation intact b/l, equal.     MEDICATIONS:  MEDICATIONS  (STANDING):  atorvastatin 20 milliGRAM(s) Oral at bedtime  oxybutynin 5 milliGRAM(s) Oral every 12 hours  pantoprazole Infusion 8 mG/Hr (10 mL/Hr) IV Continuous <Continuous>  pregabalin 50 milliGRAM(s) Oral every 8 hours    MEDICATIONS  (PRN):  acetaminophen   Tablet .. 650 milliGRAM(s) Oral every 6 hours PRN Moderate Pain (4 - 6)  morphine ER Tablet 15 milliGRAM(s) Oral every 8 hours PRN severe chronic pain    ALLERGIES:  Allergies    No Known Allergies    Intolerances    LABS:                        8.0    10.61 )-----------( 154      ( 2021 10:40 )             25.8         147<H>  |  113<H>  |  13  ----------------------------<  93  3.8   |  23  |  0.83    Ca    7.3<L>      2021 08:17  Phos  2.9       Mg     2.2         TPro  5.6<L>  /  Alb  2.7<L>  /  TBili  0.4  /  DBili  x   /  AST  16  /  ALT  9<L>  /  AlkPhos  57      PT/INR - ( 2021 00:33 )   PT: 13.6 sec;   INR: 1.14       PTT - ( 2021 00:33 )  PTT:25.8 sec  Urinalysis Basic - ( 2021 03:44 )    Color: Yellow / Appearance: Clear / S.020 / pH: x  Gluc: x / Ketone: NEGATIVE  / Bili: Negative / Urobili: 0.2 E.U./dL   Blood: x / Protein: Trace mg/dL / Nitrite: NEGATIVE   Leuk Esterase: Small / RBC: < 5 /HPF / WBC 5-10 /HPF   Sq Epi: x / Non Sq Epi: 0-5 /HPF / Bacteria: Present /HPF    CAPILLARY BLOOD GLUCOSE    RADIOLOGY & ADDITIONAL TESTS: Reviewed.

## 2021-01-27 NOTE — ED ADULT NURSE NOTE - OBJECTIVE STATEMENT
Pt presents to ED after abnormal lab finding. EMS reports pt H&H critically low so pt was sent to ED. Pt has hx of MS, contracted, weakness and limited mobility in all extremities. Pt lethargic, pale and diaphoretic upon arrival, C/O generalized body pain, chest tightness and fatigue. Pt has condom catheter and L PICC placed PTA. EKG performed, labs sent crisis. Blood consent performed by KRISH Oshea and MD Banks. RN continuing to assess.

## 2021-01-27 NOTE — H&P ADULT - NSHPLABSRESULTS_GEN_ALL_CORE
LABS:                        5.6    x     )-----------( x        ( 2021 04:27 )             18.9         145  |  110<H>  |  15  ----------------------------<  106<H>  3.8   |  26  |  0.81    Ca    7.8<L>      2021 00:33  Mg     2.2         TPro  6.1  /  Alb  2.7<L>  /  TBili  0.3  /  DBili  x   /  AST  16  /  ALT  9<L>  /  AlkPhos  54      PT/INR - ( 2021 00:33 )   PT: 13.6 sec;   INR: 1.14          PTT - ( 2021 00:33 )  PTT:25.8 sec  Urinalysis Basic - ( 2021 03:44 )    Color: Yellow / Appearance: Clear / S.020 / pH: x  Gluc: x / Ketone: NEGATIVE  / Bili: Negative / Urobili: 0.2 E.U./dL   Blood: x / Protein: Trace mg/dL / Nitrite: NEGATIVE   Leuk Esterase: Small / RBC: < 5 /HPF / WBC 5-10 /HPF   Sq Epi: x / Non Sq Epi: 0-5 /HPF / Bacteria: Present /HPF      CAPILLARY BLOOD GLUCOSE          RADIOLOGY & ADDITIONAL TESTS: Reviewed.

## 2021-01-27 NOTE — H&P ADULT - PROBLEM SELECTOR PLAN 5
- pt with hx of MS, quadriplegia, chronic pain.  - per nursing home records, pt is on MS contin 15 ER 1 tab q8h.  - per pt, he has a baclofen pump. Obtain collateral.    #Polyneuropathy  - c/w pregablin 50 mg TID    #neuromuscular dysfunction of bladder  - c/w oxybutynin 5 mg BID

## 2021-01-27 NOTE — PROGRESS NOTE ADULT - PROBLEM SELECTOR PLAN 3
Pt reports fever at nursing home, however afebrile in ED (Tmax of 100.2). States that he was started on antibiotics for unknown origin, was pancultured at NH. CXR demonstrates R hilar ?infiltrate. Patient without cough  - per nursing home records, pt was started on vanc 750 q12h and zosyn 3.375 g q8h for fever. Unclear source.  - f/u bcx, ucx, LUE picc line cx  - f/u procalcitonin  - f/u vanc trough (patient received 3 doses of vanc at NH)  - restart vanc and zosyn  - place on MRSA contact until cx returns given hx of MRSA bacteremia  - obtain collateral in AM from nursing about possible infection and abx course Pt reports fever at nursing home, however afebrile in ED (Tmax of 100.2). States that he was started on antibiotics for unknown origin, was pancultured at NH. CXR demonstrates R hilar ?infiltrate. Patient without cough  - per nursing home records, pt was started on vanc 750 q12h and zosyn 3.375 g q8h for fever. Unclear source.  -Unclear why pt was put on those abx, and attempts for collateral from NH today unsuccessful; spoke to pt's HCP who mentioned that pt was being "treated for a UTI"; will trial of abx and assessment pt's vitals and white count and follow up the obtained cultures mentioned below. Has not been febrile while in hospital.   - f/u bcx, ucx, LUE picc line cx  - Procalcitonin 0.50

## 2021-01-27 NOTE — CONSULT NOTE ADULT - ATTENDING COMMENTS
This patient was evaluated with the resident, management decisions were made, see above for the details, I agree with the A/P.  -shortness of breath  -chest discomfort associated with severe anemia  -ECG changes - likely demand ischemia 2/2 severe anemia  -severe anemia from GIB  -GIB - likely upper  -MS  -Quadriplegia  >See above for the details as d/w th residents

## 2021-01-27 NOTE — H&P ADULT - ATTENDING COMMENTS
This patient was evaluated as an ICU consultation, please refer to that note for the details, the billing was done on that note.

## 2021-01-27 NOTE — PROGRESS NOTE ADULT - PROBLEM SELECTOR PLAN 4
- pt complaining of chest tightness. EKG with ST depressions in I, avL, v2, v4-v6 with elevated troponin 0.03. No prior EKG to compare  - troponin likely elevated 2/2 to demand ischemia in the setting of severe anemia  - f/u repeat EKG and troponin - pt with hx of MS, quadriplegia, chronic pain.  - per nursing home records, pt is on MS contin 15 ER 1 tab q8h.  - per pt, he has a baclofen pump. Will try again to obtain collateral regarding dose/rate adminstered by pt's pump.    #Polyneuropathy  - c/w pregablin 50 mg TID    #neuromuscular dysfunction of bladder  - c/w oxybutynin 5 mg BID

## 2021-01-28 LAB
ANION GAP SERPL CALC-SCNC: 14 MMOL/L — SIGNIFICANT CHANGE UP (ref 5–17)
BUN SERPL-MCNC: 11 MG/DL — SIGNIFICANT CHANGE UP (ref 7–23)
CALCIUM SERPL-MCNC: 7.9 MG/DL — LOW (ref 8.4–10.5)
CHLORIDE SERPL-SCNC: 112 MMOL/L — HIGH (ref 96–108)
CO2 SERPL-SCNC: 22 MMOL/L — SIGNIFICANT CHANGE UP (ref 22–31)
CREAT SERPL-MCNC: 0.76 MG/DL — SIGNIFICANT CHANGE UP (ref 0.5–1.3)
GLUCOSE SERPL-MCNC: 75 MG/DL — SIGNIFICANT CHANGE UP (ref 70–99)
HCT VFR BLD CALC: 26 % — LOW (ref 39–50)
HCT VFR BLD CALC: 26.8 % — LOW (ref 39–50)
HGB BLD-MCNC: 7.8 G/DL — LOW (ref 13–17)
HGB BLD-MCNC: 8.1 G/DL — LOW (ref 13–17)
MAGNESIUM SERPL-MCNC: 2.1 MG/DL — SIGNIFICANT CHANGE UP (ref 1.6–2.6)
MCHC RBC-ENTMCNC: 28.8 PG — SIGNIFICANT CHANGE UP (ref 27–34)
MCHC RBC-ENTMCNC: 28.9 PG — SIGNIFICANT CHANGE UP (ref 27–34)
MCHC RBC-ENTMCNC: 30 GM/DL — LOW (ref 32–36)
MCHC RBC-ENTMCNC: 30.2 GM/DL — LOW (ref 32–36)
MCV RBC AUTO: 95.4 FL — SIGNIFICANT CHANGE UP (ref 80–100)
MCV RBC AUTO: 96.3 FL — SIGNIFICANT CHANGE UP (ref 80–100)
NRBC # BLD: 0 /100 WBCS — SIGNIFICANT CHANGE UP (ref 0–0)
NRBC # BLD: 0 /100 WBCS — SIGNIFICANT CHANGE UP (ref 0–0)
PHOSPHATE SERPL-MCNC: 2.6 MG/DL — SIGNIFICANT CHANGE UP (ref 2.5–4.5)
PLATELET # BLD AUTO: 179 K/UL — SIGNIFICANT CHANGE UP (ref 150–400)
PLATELET # BLD AUTO: 183 K/UL — SIGNIFICANT CHANGE UP (ref 150–400)
POTASSIUM SERPL-MCNC: 3.8 MMOL/L — SIGNIFICANT CHANGE UP (ref 3.5–5.3)
POTASSIUM SERPL-SCNC: 3.8 MMOL/L — SIGNIFICANT CHANGE UP (ref 3.5–5.3)
RBC # BLD: 2.7 M/UL — LOW (ref 4.2–5.8)
RBC # BLD: 2.81 M/UL — LOW (ref 4.2–5.8)
RBC # FLD: 19.1 % — HIGH (ref 10.3–14.5)
RBC # FLD: 19.4 % — HIGH (ref 10.3–14.5)
SODIUM SERPL-SCNC: 148 MMOL/L — HIGH (ref 135–145)
WBC # BLD: 10.32 K/UL — SIGNIFICANT CHANGE UP (ref 3.8–10.5)
WBC # BLD: 9.09 K/UL — SIGNIFICANT CHANGE UP (ref 3.8–10.5)
WBC # FLD AUTO: 10.32 K/UL — SIGNIFICANT CHANGE UP (ref 3.8–10.5)
WBC # FLD AUTO: 9.09 K/UL — SIGNIFICANT CHANGE UP (ref 3.8–10.5)

## 2021-01-28 PROCEDURE — 45378 DIAGNOSTIC COLONOSCOPY: CPT

## 2021-01-28 PROCEDURE — 99232 SBSQ HOSP IP/OBS MODERATE 35: CPT | Mod: 25

## 2021-01-28 PROCEDURE — 99233 SBSQ HOSP IP/OBS HIGH 50: CPT | Mod: GC

## 2021-01-28 PROCEDURE — 71045 X-RAY EXAM CHEST 1 VIEW: CPT | Mod: 26

## 2021-01-28 PROCEDURE — 43239 EGD BIOPSY SINGLE/MULTIPLE: CPT

## 2021-01-28 PROCEDURE — 88305 TISSUE EXAM BY PATHOLOGIST: CPT | Mod: 26

## 2021-01-28 RX ORDER — PANTOPRAZOLE SODIUM 20 MG/1
40 TABLET, DELAYED RELEASE ORAL EVERY 12 HOURS
Refills: 0 | Status: DISCONTINUED | OUTPATIENT
Start: 2021-01-28 | End: 2021-02-02

## 2021-01-28 RX ORDER — BACLOFEN 100 %
120 POWDER (GRAM) MISCELLANEOUS EVERY 24 HOURS
Refills: 0 | Status: DISCONTINUED | OUTPATIENT
Start: 2021-01-28 | End: 2021-02-02

## 2021-01-28 RX ORDER — PANTOPRAZOLE SODIUM 20 MG/1
8 TABLET, DELAYED RELEASE ORAL
Qty: 80 | Refills: 0 | Status: DISCONTINUED | OUTPATIENT
Start: 2021-01-28 | End: 2021-01-28

## 2021-01-28 RX ORDER — SODIUM CHLORIDE 9 MG/ML
1000 INJECTION, SOLUTION INTRAVENOUS
Refills: 0 | Status: DISCONTINUED | OUTPATIENT
Start: 2021-01-28 | End: 2021-01-31

## 2021-01-28 RX ORDER — SODIUM CHLORIDE 9 MG/ML
1000 INJECTION, SOLUTION INTRAVENOUS
Refills: 0 | Status: DISCONTINUED | OUTPATIENT
Start: 2021-01-28 | End: 2021-01-28

## 2021-01-28 RX ADMIN — Medication 650 MILLIGRAM(S): at 03:18

## 2021-01-28 RX ADMIN — Medication 50 MILLIGRAM(S): at 06:04

## 2021-01-28 RX ADMIN — SODIUM CHLORIDE 80 MILLILITER(S): 9 INJECTION, SOLUTION INTRAVENOUS at 08:32

## 2021-01-28 RX ADMIN — PANTOPRAZOLE SODIUM 40 MILLIGRAM(S): 20 TABLET, DELAYED RELEASE ORAL at 21:54

## 2021-01-28 RX ADMIN — Medication 1 DROP(S): at 13:16

## 2021-01-28 RX ADMIN — MORPHINE SULFATE 15 MILLIGRAM(S): 50 CAPSULE, EXTENDED RELEASE ORAL at 21:53

## 2021-01-28 RX ADMIN — Medication 1 DROP(S): at 23:45

## 2021-01-28 RX ADMIN — Medication 1 SPRAY(S): at 17:55

## 2021-01-28 RX ADMIN — Medication 50 MILLIGRAM(S): at 19:00

## 2021-01-28 RX ADMIN — Medication 1 DROP(S): at 00:12

## 2021-01-28 RX ADMIN — Medication 1 TABLET(S): at 14:45

## 2021-01-28 RX ADMIN — MORPHINE SULFATE 15 MILLIGRAM(S): 50 CAPSULE, EXTENDED RELEASE ORAL at 14:45

## 2021-01-28 RX ADMIN — MORPHINE SULFATE 15 MILLIGRAM(S): 50 CAPSULE, EXTENDED RELEASE ORAL at 06:04

## 2021-01-28 RX ADMIN — PANTOPRAZOLE SODIUM 10 MG/HR: 20 TABLET, DELAYED RELEASE ORAL at 06:03

## 2021-01-28 RX ADMIN — Medication 50 MILLIGRAM(S): at 00:12

## 2021-01-28 RX ADMIN — Medication 500 MILLIGRAM(S): at 14:17

## 2021-01-28 RX ADMIN — Medication 1000 UNIT(S): at 17:27

## 2021-01-28 RX ADMIN — Medication 1 DROP(S): at 06:04

## 2021-01-28 RX ADMIN — Medication 325 MILLIGRAM(S): at 14:17

## 2021-01-28 RX ADMIN — Medication 5 MILLIGRAM(S): at 06:04

## 2021-01-28 RX ADMIN — SODIUM CHLORIDE 80 MILLILITER(S): 9 INJECTION, SOLUTION INTRAVENOUS at 18:59

## 2021-01-28 RX ADMIN — Medication 5 MILLIGRAM(S): at 19:00

## 2021-01-28 RX ADMIN — ATORVASTATIN CALCIUM 20 MILLIGRAM(S): 80 TABLET, FILM COATED ORAL at 21:54

## 2021-01-28 RX ADMIN — Medication 1 SPRAY(S): at 06:03

## 2021-01-28 RX ADMIN — ZINC SULFATE TAB 220 MG (50 MG ZINC EQUIVALENT) 220 MILLIGRAM(S): 220 (50 ZN) TAB at 14:18

## 2021-01-28 NOTE — PROGRESS NOTE ADULT - SUBJECTIVE AND OBJECTIVE BOX
TRANSFER ACCEPTANCE NOTE [address] TO [address] [place] [address] COURSE:  55 year old male with MS, quadriplegia, PERI, htn, hld, bipolar disorder, SICCA, polyneuropathy, pressure ulcers, and chronic pain who presented to [facility] from his nursing home for a Hg of 3.6. Patient denied any melanotic bowel movements or NSAID use; however, nursing home reportedly appreciated some. He was given 4 units of pRBC with good response in Hg. Patient completed his prep and underwent endoscopy/colonoscopy . Anesthesia had already been made aware of the possibility of difficult extubation due to severe MS and baclofen pump pre-procedure. Patient was intubated during the procedure and transferred to MICU for planned extubation when patient deemed capable.   Patient underwent endoscopy EGD- gastritis ( biopsies taken), clean based duodenal ulcer in bulb. Colonoscopy: poor prep, dark stool throughout colon. Scope reached cecum. No mass seen. Patient successfully extubated on  after endoscopy.   Per GI recs, plan is PPI 40 mg po BID for 2 months, avoid NSAIDs. Will need repeat EGD in 2 months to assess for healing. Follow up pathology.  Patient stable to be stepped back down to [facility].    SUBJECTIVE: Patient seen and examined at bedside when brought back from endoscopy/colonoscopy. Patient intubated, sedated in no initial distress. Patient then successfully extubated.     VITAL SIGNS:  Vital Signs Last 24 Hrs  T(C): 36.7 (2021 14:22), Max: 36.7 (2021 21:20)  T(F): 98.1 (2021 14:22), Max: 98.1 (2021 21:20)  HR: 81 (2021 16:00) (56 - 88)  BP: 176/91 (2021 16:00) (103/62 - 187/95)  BP(mean): 127 (2021 16:00) (77 - 134)  RR: 17 (2021 16:00) (13 - 36)  SpO2: 100% (2021 16:00) (98% - 100%)    21 @ 07:01  -  21 @ 07:00  --------------------------------------------------------  IN: 2380 mL / OUT: 1050 mL / NET: 1330 mL    PHYSICAL EXAM:    General: laying supine, NAD  HEENT: NC/AT; PERRL, anicteric sclera; MMM, interestingly no conjunctival pallor  Cardiovascular: +S1/S2; RRR  Respiratory: Inspiratory effort/excursions not great, but no crackles/wheezes, rhonchi heard.   Gastrointestinal: soft, NT/ND; +BSx4 hyperactive, with some noted liquid stool visible underneath pt, surgical scar present baclofen pump palpable in Rside of abd adjacent to scar  Extremities: R upper extremity contracted, extremities cool but not cold; 1+ pitting edema b/l up to knee.   Vascular: 2+ radial, DP/PT pulses B/L  Neurological: AAOx3, functionally quadriplegic; Pt not participating in strength assessment as he said that he wouldn't be able to do it if he tried. Sensation intact b/l, equal.     MEDICATIONS:  MEDICATIONS  (STANDING):  artificial  tears Solution 1 Drop(s) Both EYES four times a day  ascorbic acid 500 milliGRAM(s) Oral daily  atorvastatin 20 milliGRAM(s) Oral at bedtime  baclofen PF IntraThecal 120 MICROGram(s) IntraThecal every 24 hours  cholecalciferol 1000 Unit(s) Oral daily  ferrous    sulfate 325 milliGRAM(s) Oral daily  metoprolol tartrate 50 milliGRAM(s) Oral two times a day  morphine ER Tablet 15 milliGRAM(s) Oral every 8 hours  multivitamin/minerals 1 Tablet(s) Oral daily  oxybutynin 5 milliGRAM(s) Oral every 12 hours  pantoprazole    Tablet 40 milliGRAM(s) Oral every 12 hours  pantoprazole Infusion 8 mG/Hr (10 mL/Hr) IV Continuous <Continuous>  pregabalin 50 milliGRAM(s) Oral every 8 hours  senna 2 Tablet(s) Oral at bedtime  sodium chloride 0.45%. 1000 milliLiter(s) (80 mL/Hr) IV Continuous <Continuous>  sodium chloride 0.65% Nasal 1 Spray(s) Both Nostrils two times a day  zinc sulfate 220 milliGRAM(s) Oral daily    MEDICATIONS  (PRN):  acetaminophen   Tablet .. 650 milliGRAM(s) Oral every 6 hours PRN Moderate Pain (4 - 6)      ALLERGIES:  Allergies    No Known Allergies    Intolerances      LABS:                        8.1    9.09  )-----------( 183      ( 2021 06:40 )             26.8         148<H>  |  112<H>  |  11  ----------------------------<  75  3.8   |  22  |  0.76    Ca    7.9<L>      2021 06:40  Phos  2.6       Mg     2.1         TPro  5.6<L>  /  Alb  2.7<L>  /  TBili  0.4  /  DBili  x   /  AST  16  /  ALT  9<L>  /  AlkPhos  57      PT/INR - ( 2021 00:33 )   PT: 13.6 sec;   INR: 1.14          PTT - ( 2021 00:33 )  PTT:25.8 sec  Urinalysis Basic - ( 2021 03:44 )    Color: Yellow / Appearance: Clear / S.020 / pH: x  Gluc: x / Ketone: NEGATIVE  / Bili: Negative / Urobili: 0.2 E.U./dL   Blood: x / Protein: Trace mg/dL / Nitrite: NEGATIVE   Leuk Esterase: Small / RBC: < 5 /HPF / WBC 5-10 /HPF   Sq Epi: x / Non Sq Epi: 0-5 /HPF / Bacteria: Present /HPF      CAPILLARY BLOOD GLUCOSE      POCT Blood Glucose.: 102 mg/dL (2021 17:30)        RADIOLOGY & ADDITIONAL TESTS: Reviewed.

## 2021-01-28 NOTE — PROGRESS NOTE ADULT - ASSESSMENT
Pt is a 54 y/o male, poor historian w/ pmhx of MS, quadriplegia, iron deficiency anemia, HTN, HLD, bipolar disorder, sicca syndrome, polyneuropathy, chronic pain, pressure ulcer, mrsa infection, presenting from nursing home with Hb of 3.6. Pt admitted for severe anemia 2/2 presumed GI bleed, w/ plan for colonoscopy + endoscopy 1/28. Patient then admitted to MICU for extubation and closer monitoring.     NEURO: patient extubated post endoscopy on 1/28. AAOx3  #MS: history of MS, quadriplegia, and chronic pain.   - per nursing home records, pt is on MS contin 15 ER 1 tab q8h.  - C/w Baclofen pump     #Polyneuropathy  - c/w Pregabalin 50 mg TID    #neuromuscular dysfunction of bladder  - c/w Oxybutynin 5 mg BID    CARDIOVASCULAR:  #Hypertension: per nursing home records, pt on metoprolol tartrate 100 mg BID  - C/w Lopressor 50 mg BID     #HLD  - c/w atorvastatin 20 mg QHS.     #R/O ACS: RESOLVED  -Initially c/o CP on adm, w/ EKG with ST depressions in I, avL, v2, v4-v6 with elevated troponin 0.03. No prior EKG to compare  - troponin thought likely elevated 2/2 to demand ischemia in the setting of severe anemia, plateaued on repeat.  -Pt's CP significantly improved/resolved following blood transfusions.   -Continue to monitor     RESPIRATORY: patient extubated on 1/28   #Acute respiratory failure with hypoxia: RESOLVED   - pt c/o of SOB and chest tightness likely 2/2 to anemia  - Was on NC, weaned to RA, satting high 90s-100%.   - CXR w/o acute infiltrates.     GI:  #R/O GI Bleed: presenting with melena likely 2/2 GI bleed   - Per ED rectal exam, ?melena, black secretions. positive FOBT.  - Transfused as above  - F/u GI recs. EGD performed on 1/27- gastritis ( biopsies taken), clean based duodenal ulcer in bulb ( Likely source of anemia)  Colonoscopy: poor prep, dark brown stool throughout colon. Scope reached cecum. No mass seen, no signs of lower GIB  - c/w PPI gtt and keep NPO  - maintain active type and screen  - maintain IV access.       HEME/ONC:  #Severe anemia: IMPROVING  Patient with hx of PERI, poor historian but reports history of "low blood levels" Presented w/ 3.5 and FOBT positive in ED.   -S/P 4u prbc on adm, with improvement in hb to 8.0. 1/28 AM hb 8.1  - B12 1339, Folate > 20  - Iron studies showing PERI  - Transfuse Hb >7  - GI workup as detailed below.       ID:                          Problem/Plan - 3:  ·  Problem: Fever.  Plan: Pt reports fever at nursing home, however afebrile in ED (Tmax of 100.2). States that he was started on antibiotics for unknown origin, was pancultured at NH. CXR demonstrates R hilar ?infiltrate. Patient without cough  - per nursing home records, pt was started on vanc 750 q12h and zosyn 3.375 g q8h for fever. Unclear source.  -Unclear why pt was put on those abx, and attempts for collateral from NH today unsuccessful; spoke to pt's HCP who mentioned that pt was being "treated for a UTI"; will trial of abx and assessment pt's vitals and white count and follow up the obtained cultures mentioned below. Has not been febrile while in hospital.   - f/u bcx, ucx, LUE picc line cx  - Procalcitonin 0.50.            Problem/Plan - 6:  Problem: Constipation. Plan: - per nursing home record, pt on mirilax BID, senna 2 tabs at bedtime, docusate 300 mg QHS, lactulose 30 ml BID, and enemas prn  - c/w home meds.         Problem/Plan - 9:  ·  Problem: Prophylactic measure.  Plan: F: s/p 4U PRBC; 1/2 normal saline at 80 cc/hr for 10 hr  E: replete PRN  N: NPO for EGD/colonoscopy     DVT ppx: SCDs, hold lovenox in setting of r/o GIB  Dispo:   FULL CODE.    Pt is a 54 y/o male, poor historian w/ pmhx of MS, quadriplegia, iron deficiency anemia, HTN, HLD, bipolar disorder, sicca syndrome, polyneuropathy, chronic pain, pressure ulcer, mrsa infection, presenting from nursing home with Hb of 3.6. Pt admitted for severe anemia 2/2 presumed GI bleed, w/ plan for colonoscopy + endoscopy 1/28. Patient then admitted to MICU for extubation and closer monitoring.     NEURO: patient extubated post endoscopy on 1/28. AAOx3  #MS: history of MS, quadriplegia, and chronic pain.   - per nursing home records, pt is on MS contin 15 ER 1 tab q8h.  - C/w Baclofen pump     #Polyneuropathy  - c/w Pregabalin 50 mg TID    #neuromuscular dysfunction of bladder  - c/w Oxybutynin 5 mg BID    CARDIOVASCULAR:  #Hypertension: per nursing home records, pt on metoprolol tartrate 100 mg BID  - C/w Lopressor 50 mg BID     #HLD  - c/w atorvastatin 20 mg QHS.     #R/O ACS: RESOLVED  -Initially c/o CP on adm, w/ EKG with ST depressions in I, avL, v2, v4-v6 with elevated troponin 0.03. No prior EKG to compare  - troponin thought likely elevated 2/2 to demand ischemia in the setting of severe anemia, plateaued on repeat.  -Pt's CP significantly improved/resolved following blood transfusions.   -Continue to monitor     RESPIRATORY: patient extubated on 1/28   #Acute respiratory failure with hypoxia: RESOLVED   - pt c/o of SOB and chest tightness likely 2/2 to anemia  - Was on NC, weaned to RA, satting high 90s-100%.   - CXR w/o acute infiltrates.     GI:  #R/O GI Bleed: presenting with melena likely 2/2 GI bleed   - Per ED rectal exam, ?melena, black secretions. positive FOBT.  - Transfused as above  - F/u GI recs. EGD performed on 1/27- gastritis ( biopsies taken), clean based duodenal ulcer in bulb ( Likely source of anemia)  Colonoscopy: poor prep, dark brown stool throughout colon. Scope reached cecum. No mass seen, no signs of lower GIB  - c/w PPI gtt and keep NPO  - maintain active type and screen  - maintain IV access.     HEME/ONC:  #Severe anemia: IMPROVING  Patient with hx of PERI, poor historian but reports history of "low blood levels" Presented w/ 3.5 and FOBT positive in ED.   -S/P 4u prbc on adm, with improvement in hb to 8.0. 1/28 AM hb 8.1  - B12 1339, Folate > 20  - Iron studies showing PERI  - Transfuse Hb >7  -Maintain active   - GI workup as detailed above       ID:                          Problem/Plan - 3:  ·  Problem: Fever.  Plan: Pt reports fever at nursing home, however afebrile in ED (Tmax of 100.2). States that he was started on antibiotics for unknown origin, was pancultured at NH. CXR demonstrates R hilar ?infiltrate. Patient without cough  - per nursing home records, pt was started on vanc 750 q12h and zosyn 3.375 g q8h for fever. Unclear source.  -Unclear why pt was put on those abx, and attempts for collateral from NH today unsuccessful; spoke to pt's HCP who mentioned that pt was being "treated for a UTI"; will trial of abx and assessment pt's vitals and white count and follow up the obtained cultures mentioned below. Has not been febrile while in hospital.   - f/u bcx, ucx, LUE picc line cx  - Procalcitonin 0.50.            Problem/Plan - 6:  Problem: Constipation. Plan: - per nursing home record, pt on mirilax BID, senna 2 tabs at bedtime, docusate 300 mg QHS, lactulose 30 ml BID, and enemas prn  - c/w home meds.         Problem/Plan - 9:  ·  Problem: Prophylactic measure.  Plan: F: s/p 4U PRBC; 1/2 normal saline at 80 cc/hr for 10 hr  E: replete PRN  N: NPO for EGD/colonoscopy     DVT ppx: SCDs, hold lovenox in setting of r/o GIB  Dispo:   FULL CODE.    Pt is a 54 y/o male, poor historian w/ pmhx of MS, quadriplegia, iron deficiency anemia, HTN, HLD, bipolar disorder, sicca syndrome, polyneuropathy, chronic pain, pressure ulcer, mrsa infection, presenting from nursing home with Hb of 3.6. Pt admitted for severe anemia 2/2 presumed GI bleed, w/ plan for colonoscopy + endoscopy 1/28. Patient then admitted to MICU for extubation and closer monitoring.     NEURO: patient extubated post endoscopy on 1/28. AAOx3  #MS: history of MS, quadriplegia, and chronic pain.   - per nursing home records, pt is on MS contin 15 ER 1 tab q8h.  - C/w Baclofen pump     #Polyneuropathy  - c/w Pregabalin 50 mg TID    #neuromuscular dysfunction of bladder  - c/w Oxybutynin 5 mg BID    CARDIOVASCULAR:  #Hypertension: per nursing home records, pt on metoprolol tartrate 100 mg BID  - C/w Lopressor 50 mg BID     #HLD  - c/w atorvastatin 20 mg QHS.     #R/O ACS: RESOLVED  -Initially c/o CP on adm, w/ EKG with ST depressions in I, avL, v2, v4-v6 with elevated troponin 0.03. No prior EKG to compare  - troponin thought likely elevated 2/2 to demand ischemia in the setting of severe anemia, plateaued on repeat.  -Pt's CP significantly improved/resolved following blood transfusions.   -Continue to monitor     RESPIRATORY: patient extubated on 1/28   #Acute respiratory failure with hypoxia: RESOLVED   - pt c/o of SOB and chest tightness likely 2/2 to anemia  - Was on NC, weaned to RA, satting high 90s-100%.   - CXR w/o acute infiltrates.     GI:  #R/O GI Bleed: presenting with melena likely 2/2 GI bleed   - Per ED rectal exam, ?melena, black secretions. positive FOBT.  - Transfused as above  - F/u GI recs. EGD performed on 1/27- gastritis ( biopsies taken), clean based duodenal ulcer in bulb ( Likely source of anemia)  Colonoscopy: poor prep, dark brown stool throughout colon. Scope reached cecum. No mass seen, no signs of lower GIB  -Follow up pathology  -PPI 40 mg po BID for 2 months  -Avoid NSAIDs  -Will need repeat EGD in 2 months to assess for healing  Resume colonoscopy surveillance schedule- given poor prep this colonoscopy could not assess for small polyps  Please notify GI if any signs of bleeding  - c/w PPI gtt   - maintain active type and screen  - maintain IV access.     #Constipation:  - per nursing home record, pt on Miralax BID, senna 2 tabs at bedtime, docusate 300 mg QHS, lactulose 30 ml BID, and enemas prn  - c/w Senna     HEME/ONC:  #Severe anemia: IMPROVING  Patient with hx of PERI, poor historian but reports history of "low blood levels" Presented w/ 3.5 and FOBT positive in ED.   -S/P 4u prbc on adm, with improvement in hb to 8.0. 1/28 AM hb 8.1  - B12 1339, Folate > 20  - Iron studies showing PERI  -C/w Ferrous sulfate 325 mg daily   - Transfuse Hb >7  -Maintain active type and screen  - GI workup as detailed above     ID:   KALANI     :  KALANI     F: s/p 4 units PRBC, fluids as needed   E: replete PRN   N: NPO as just extubated. Pending bedside dysphagia and S&S   DVT: SCDs for now in the setting of possible bleed. Holding Lovenox   DISPO: FULL CODE

## 2021-01-28 NOTE — DIETITIAN INITIAL EVALUATION ADULT. - ADD RECOMMEND
1. Consider SLP consult prior to diet advancement 2/2 hx MS. If cleared for PO, recommend regular diet with Ensure Enlive BID (700 kcal, 40g protein, 360 mL free H2O). Provide assistance as necessary at meals and maintain aspiration precautions at all times 2. Continue w/micronutrient supplementation 3. Monitor lytes and replete prn. POC BG q6hr 4. Pain and bowel regimens per team

## 2021-01-28 NOTE — PROGRESS NOTE ADULT - PROBLEM SELECTOR PLAN 6
- per nursing home record, pt on Miralax BID, senna 2 tabs at bedtime, docusate 300 mg QHS, lactulose 30 ml BID, and enemas prn  - c/w home meds

## 2021-01-28 NOTE — PROGRESS NOTE ADULT - PROBLEM SELECTOR PLAN 2
Possible upper GI bleed   - Per ED rectal exam, ?melena, black secretions. positive FOBT.  - Transfused as above  - F/u GI recs and results of 1/28 colonoscopy + endoscopy   - c/w PPI gtt and keep NPO  - maintain active type and screen  - maintain IV access

## 2021-01-28 NOTE — PROGRESS NOTE ADULT - ASSESSMENT
Pt is a 54 y/o male, poor historian w/ pmhx of MS, quadriplegia, iron deficiency anemia, HTN, HLD, bipolar disorder, sicca syndrome, polyneuropathy, chronic pain, pressure ulcer, mrsa infection, presenting from nursing home with Hb of 3.6. Pt admitted for severe anemia 2/2 presumed GI bleed, w/ plan for colonoscopy + endoscopy 1/28.

## 2021-01-28 NOTE — PROGRESS NOTE ADULT - SUBJECTIVE AND OBJECTIVE BOX
IN PROGRESS     [facility]: 55 year old male with MS, quadriplegia, PERI, htn, hld, bipolar disorder, SICCA, polyneuropathy, and chronic pain who presented to [facility] from his nursing home for a Hg of 3.6. Patient denied any melanotic bowel movements or NSAID use; however, nursing home reportedly appreciated some. He was given 4 units of pRBC, completed his movieprep     [facility]: 55 year old male with MS, quadriplegia, PERI, htn, hld, bipolar disorder, SICCA, polyneuropathy, pressure ulcers, and chronic pain who presented to [facility] from his nursing home for a Hg of 3.6. Patient denied any melanotic bowel movements or NSAID use; however, nursing home reportedly appreciated some. He was given 4 units of pRBC with good response in Hg. Patient completed his prep and underwent endoscopy/colonoscopy 1/28. Anesthesia had already been made aware of the possibility of difficult extubation due to severe MS and baclofen pump pre-procedure. Patient was intubated during the procedure and transferred to MICU for planned extubation when patient deemed capable.      [facility]  [facility]: 55 year old male with MS, quadriplegia, PERI, htn, hld, bipolar disorder, SICCA, polyneuropathy, pressure ulcers, and chronic pain who presented to [facility] from his nursing home for a Hg of 3.6. Patient denied any melanotic bowel movements or NSAID use; however, nursing home reportedly appreciated some. He was given 4 units of pRBC with good response in Hg. Patient completed his prep and underwent endoscopy/colonoscopy . Anesthesia had already been made aware of the possibility of difficult extubation due to severe MS and baclofen pump pre-procedure. Patient was intubated during the procedure and transferred to MICU for planned extubation when patient deemed capable.     OVERNIGHT EVENTS: 8pm hb 7.3.    SUBJECTIVE / INTERVAL HPI: Patient seen and examined at bedside. This am, did not have any new complaints; has been having lots of BMs since drinking the prep. Still has some intm mild CP that he says has significantly improved from adm. No SOB. No palpitations. No lightheadedness/dizziness. No f/c/n/v.     VITAL SIGNS:  Vital Signs Last 24 Hrs  T(C): 36.5 (2021 06:00), Max: 37.1 (2021 13:32)  T(F): 97.7 (2021 06:00), Max: 98.8 (2021 13:32)  HR: 64 (2021 08:30) (64 - 92)  BP: 163/98 (2021 08:30) (103/62 - 174/84)  BP(mean): 120 (2021 08:30) (77 - 121)  RR: 16 (2021 08:30) (16 - 18)  SpO2: 99% (2021 08:30) (98% - 100%)    PHYSICAL EXAM:  General: laying supine, NAD  HEENT: NC/AT; PERRL, anicteric sclera; MMM, interestingly no conjunctival pallor  Cardiovascular: +S1/S2; RRR  Respiratory: Inspiratory effort/excursions not great, but no crackles/wheezes, rhonchi heard.   Gastrointestinal: soft, NT/ND; +BSx4 hyperactive, with some noted liquid stool visible underneath pt, surgical scar present baclofen pump palpable in Rside of abd adjacent to scar  Extremities: R upper extremity contracted, extremities cool but not cold; 1+ pitting edema b/l up to knee.   Vascular: 2+ radial, DP/PT pulses B/L  Neurological: AAOx3, functionally quadriplegic; Pt not participating in strength assessment as he said that he wouldn't be able to do it if he tried. Sensation intact b/l, equal.     MEDICATIONS:  MEDICATIONS  (STANDING):  artificial  tears Solution 1 Drop(s) Both EYES four times a day  ascorbic acid 500 milliGRAM(s) Oral daily  atorvastatin 20 milliGRAM(s) Oral at bedtime  cholecalciferol 1000 Unit(s) Oral daily  ferrous    sulfate 325 milliGRAM(s) Oral daily  metoprolol tartrate 50 milliGRAM(s) Oral two times a day  morphine ER Tablet 15 milliGRAM(s) Oral every 8 hours  multivitamin/minerals 1 Tablet(s) Oral daily  oxybutynin 5 milliGRAM(s) Oral every 12 hours  pantoprazole Infusion 8 mG/Hr (10 mL/Hr) IV Continuous <Continuous>  pregabalin 50 milliGRAM(s) Oral every 8 hours  senna 2 Tablet(s) Oral at bedtime  sodium chloride 0.45%. 1000 milliLiter(s) (80 mL/Hr) IV Continuous <Continuous>  sodium chloride 0.65% Nasal 1 Spray(s) Both Nostrils two times a day  zinc sulfate 220 milliGRAM(s) Oral daily    MEDICATIONS  (PRN):  acetaminophen   Tablet .. 650 milliGRAM(s) Oral every 6 hours PRN Moderate Pain (4 - 6)    ALLERGIES:  Allergies    No Known Allergies    Intolerances    LABS:                        8.1    9.09  )-----------( 183      ( 2021 06:40 )             26.8         148<H>  |  112<H>  |  11  ----------------------------<  75  3.8   |  22  |  0.76    Ca    7.9<L>      2021 06:40  Phos  2.6       Mg     2.1         TPro  5.6<L>  /  Alb  2.7<L>  /  TBili  0.4  /  DBili  x   /  AST  16  /  ALT  9<L>  /  AlkPhos  57      PT/INR - ( 2021 00:33 )   PT: 13.6 sec;   INR: 1.14       PTT - ( 2021 00:33 )  PTT:25.8 sec  Urinalysis Basic - ( 2021 03:44 )    Color: Yellow / Appearance: Clear / S.020 / pH: x  Gluc: x / Ketone: NEGATIVE  / Bili: Negative / Urobili: 0.2 E.U./dL   Blood: x / Protein: Trace mg/dL / Nitrite: NEGATIVE   Leuk Esterase: Small / RBC: < 5 /HPF / WBC 5-10 /HPF   Sq Epi: x / Non Sq Epi: 0-5 /HPF / Bacteria: Present /HPF    CAPILLARY BLOOD GLUCOSE    POCT Blood Glucose.: 102 mg/dL (2021 17:30)    RADIOLOGY & ADDITIONAL TESTS: Reviewed.

## 2021-01-28 NOTE — PROGRESS NOTE ADULT - PROBLEM SELECTOR PLAN 4
- pt with hx of MS, quadriplegia, chronic pain  - per nursing home records, pt is on MS contin 15 ER 1 tab q8h  - per pt, he has a baclofen pump - confirmed 1/28, 120 mcg/day, Concentration: 2000 mcg/mL    #Polyneuropathy  - c/w pregablin 50 mg TID    #neuromuscular dysfunction of bladder  - c/w oxybutynin 5 mg BID

## 2021-01-28 NOTE — PROGRESS NOTE ADULT - PROBLEM SELECTOR PLAN 4
- pt with hx of MS, quadriplegia, chronic pain.  - per nursing home records, pt is on MS contin 15 ER 1 tab q8h.  - per pt, he has a baclofen pump. Will obtain collateral regarding dose/rate adminstered by pt's pump.    #Polyneuropathy  - c/w pregablin 50 mg TID    #neuromuscular dysfunction of bladder  - c/w oxybutynin 5 mg BID

## 2021-01-28 NOTE — PROGRESS NOTE ADULT - PROBLEM SELECTOR PLAN 7
RESOLVED   - pt c/o of SOB and chest tightness likely 2/2 to anemia  - Was on NC, weaned to RA, satting high 90s-100%.   - CXR w/o acute infiltrates.   - low suspicion for covid pna or PE. D-dimer wnl

## 2021-01-28 NOTE — DIETITIAN INITIAL EVALUATION ADULT. - PROBLEM SELECTOR PLAN 3
Pt reports fever at nursing home, however afebrile in ED (Tmax of 100.2). States that he was started on antibiotics for unknown origin, was pancultured at NH. CXR demonstrates R hilar ?infiltrate. Patient without cough  - per nursing home records, pt was started on vanc 750 q12h and zosyn 3.375 g q8h for fever. Unclear source.  - f/u bcx, ucx, LUE picc line cx  - f/u procalcitonin  - f/u vanc trough (patient received 3 doses of vanc at NH)  - restart vanc and zosyn  - place on MRSA contact until cx returns given hx of MRSA bacteremia  - obtain collateral in AM from nursing about possible infection and abx course

## 2021-01-28 NOTE — PROGRESS NOTE ADULT - PROBLEM SELECTOR PLAN 3
Pt reports fever at nursing home, however afebrile in ED (Tmax of 100.2). States that he was started on antibiotics for unknown origin, was pancultured at NH. CXR demonstrates R hilar ?infiltrate. Patient without cough  - per nursing home records, pt was started on vanc 750 q12h and zosyn 3.375 g q8h for fever. Unclear source.  -Unclear why pt was put on those abx, and attempts for collateral from NH today unsuccessful; spoke to pt's HCP who mentioned that pt was being "treated for a UTI"; will trial of abx and assessment pt's vitals and white count and follow up the obtained cultures mentioned below. Has not been febrile while in hospital.   - f/u bcx, ucx, LUE picc line cx  - Procalcitonin 0.50

## 2021-01-28 NOTE — PROGRESS NOTE ADULT - PROBLEM SELECTOR PLAN 5
- per nursing home records, pt on metoprolol tartrate 100 mg BID  - holding in setting of GI bleed    #HLD  - c/w atorvastatin 20 mg QHS

## 2021-01-28 NOTE — PROGRESS NOTE ADULT - SUBJECTIVE AND OBJECTIVE BOX
Transfer Acceptance Note from [facility] to [facility]U:   Hospital Course: 55 year old male with MS, quadriplegia, PERI, htn, hld, bipolar disorder, SICCA, polyneuropathy, pressure ulcers, and chronic pain who presented to [facility] from his nursing home for a Hg of 3.6. Patient denied any melanotic bowel movements or NSAID use; however, nursing home reportedly appreciated some. He was given 4 units of pRBC with good response in Hg. Patient completed his prep and underwent endoscopy/colonoscopy . Anesthesia had already been made aware of the possibility of difficult extubation due to severe MS and baclofen pump pre-procedure. Patient was intubated during the procedure and transferred to MICU for planned extubation when patient deemed capable.     SUBJECTIVE: Patient seen and examined at bedside when brought back from endoscopy/colonoscopy. Patient intubated, sedated in no initial distress. Patient then successfully extubated.     OBJECTIVE:    VITAL SIGNS:  ICU Vital Signs Last 24 Hrs  T(C): 36.7 (2021 14:22), Max: 36.8 (2021 17:14)  T(F): 98.1 (2021 14:22), Max: 98.2 (2021 17:14)  HR: 74 (2021 15:00) (56 - 88)  BP: 174/99 (2021 13:00) (103/62 - 174/99)  BP(mean): 129 (2021 13:00) (77 - 129)  ABP: --  ABP(mean): --  RR: 14 (2021 15:00) (13 - 36)  SpO2: 100% (2021 15:00) (98% - 100%)    Mode: AC/ CMV (Assist Control/ Continuous Mandatory Ventilation), RR (machine): 12, TV (machine): 500, FiO2: 60, PEEP: 5, ITime: 1, MAP: 7.6, PIP: 20     @ 07:01  -   @ 07:00  --------------------------------------------------------  IN: 2380 mL / OUT: 1050 mL / NET: 1330 mL      CAPILLARY BLOOD GLUCOSE      POCT Blood Glucose.: 102 mg/dL (2021 17:30)      PHYSICAL EXAM:    General: in bed, intubated, in no distress. Patient later extubated.  HEENT: NC/AT; PERRL, anicteric sclera; MMM  Cardiovascular: +S1/S2; RRR  Respiratory: poor inspiratory effort/excursions not great; no W/R/R appreciated. On vent initially.   Gastrointestinal: soft, NT/ND; +BSx4 hyperactive, surgical scar present baclofen pump palpable in R side of abd adjacent to scar  Extremities: R upper extremity contracted, extremities cool; 1+ pitting edema b/l up to knee.   Vascular: 2+ radial, DP/PT pulses B/L  Neurological: initially on vent.  Normally, patient AAOx3 and functionally quadriplegic    MEDICATIONS:  MEDICATIONS  (STANDING):  artificial  tears Solution 1 Drop(s) Both EYES four times a day  ascorbic acid 500 milliGRAM(s) Oral daily  atorvastatin 20 milliGRAM(s) Oral at bedtime  baclofen PF IntraThecal 120 MICROGram(s) IntraThecal every 24 hours  cholecalciferol 1000 Unit(s) Oral daily  ferrous    sulfate 325 milliGRAM(s) Oral daily  metoprolol tartrate 50 milliGRAM(s) Oral two times a day  morphine ER Tablet 15 milliGRAM(s) Oral every 8 hours  multivitamin/minerals 1 Tablet(s) Oral daily  oxybutynin 5 milliGRAM(s) Oral every 12 hours  pantoprazole    Tablet 40 milliGRAM(s) Oral every 12 hours  pantoprazole Infusion 8 mG/Hr (10 mL/Hr) IV Continuous <Continuous>  pregabalin 50 milliGRAM(s) Oral every 8 hours  senna 2 Tablet(s) Oral at bedtime  sodium chloride 0.45%. 1000 milliLiter(s) (80 mL/Hr) IV Continuous <Continuous>  sodium chloride 0.65% Nasal 1 Spray(s) Both Nostrils two times a day  zinc sulfate 220 milliGRAM(s) Oral daily    MEDICATIONS  (PRN):  acetaminophen   Tablet .. 650 milliGRAM(s) Oral every 6 hours PRN Moderate Pain (4 - 6)      ALLERGIES:  Allergies    No Known Allergies    Intolerances        LABS:                        8.1    9.09  )-----------( 183      ( 2021 06:40 )             26.8         148<H>  |  112<H>  |  11  ----------------------------<  75  3.8   |  22  |  0.76    Ca    7.9<L>      2021 06:40  Phos  2.6       Mg     2.1         TPro  5.6<L>  /  Alb  2.7<L>  /  TBili  0.4  /  DBili  x   /  AST  16  /  ALT  9<L>  /  AlkPhos  57      PT/INR - ( 2021 00:33 )   PT: 13.6 sec;   INR: 1.14          PTT - ( 2021 00:33 )  PTT:25.8 sec  Urinalysis Basic - ( 2021 03:44 )    Color: Yellow / Appearance: Clear / S.020 / pH: x  Gluc: x / Ketone: NEGATIVE  / Bili: Negative / Urobili: 0.2 E.U./dL   Blood: x / Protein: Trace mg/dL / Nitrite: NEGATIVE   Leuk Esterase: Small / RBC: < 5 /HPF / WBC 5-10 /HPF   Sq Epi: x / Non Sq Epi: 0-5 /HPF / Bacteria: Present /HPF      RADIOLOGY & ADDITIONAL TESTS: Reviewed. Transfer Acceptance Note from [facility] to MICU:   Hospital Course: 55 year old male with MS, quadriplegia, PERI, htn, hld, bipolar disorder, SICCA, polyneuropathy, pressure ulcers, and chronic pain who presented to [facility] from his nursing home for a Hg of 3.6. Patient denied any melanotic bowel movements or NSAID use; however, nursing home reportedly appreciated some. He was given 4 units of pRBC with good response in Hg. Patient completed his prep and underwent endoscopy/colonoscopy . Anesthesia had already been made aware of the possibility of difficult extubation due to severe MS and baclofen pump pre-procedure. Patient was intubated during the procedure and transferred to MICU for planned extubation when patient deemed capable.   Patient underwent endoscopy EGD- gastritis ( biopsies taken), clean based duodenal ulcer in bulb. Colonoscopy: poor prep, dark stool throughout colon. Scope reached cecum. No mass seen. Patient successfully extubated on  after endoscopy.   Per GI recs, plan is PPI 40 mg po BID for 2 months, avoid NSAIDs. Will need repeat EGD in 2 months to assess for healing. Follow up pathology.  Patient stable to be stepped back down to [facility].    SUBJECTIVE: Patient seen and examined at bedside when brought back from endoscopy/colonoscopy. Patient intubated, sedated in no initial distress. Patient then successfully extubated.     OBJECTIVE:    VITAL SIGNS:  ICU Vital Signs Last 24 Hrs  T(C): 36.7 (2021 14:22), Max: 36.8 (2021 17:14)  T(F): 98.1 (2021 14:22), Max: 98.2 (2021 17:14)  HR: 74 (2021 15:00) (56 - 88)  BP: 174/99 (2021 13:00) (103/62 - 174/99)  BP(mean): 129 (2021 13:00) (77 - 129)  ABP: --  ABP(mean): --  RR: 14 (2021 15:00) (13 - 36)  SpO2: 100% (2021 15:00) (98% - 100%)    Mode: AC/ CMV (Assist Control/ Continuous Mandatory Ventilation), RR (machine): 12, TV (machine): 500, FiO2: 60, PEEP: 5, ITime: 1, MAP: 7.6, PIP: 20     @ 07:01  -   @ 07:00  --------------------------------------------------------  IN: 2380 mL / OUT: 1050 mL / NET: 1330 mL      CAPILLARY BLOOD GLUCOSE      POCT Blood Glucose.: 102 mg/dL (2021 17:30)      PHYSICAL EXAM:    General: in bed, intubated, in no distress. Patient later extubated.  HEENT: NC/AT; PERRL, anicteric sclera; MMM  Cardiovascular: +S1/S2; RRR  Respiratory: on vent. Lungs CTABL, but poor inspiratory effort   Gastrointestinal: soft, NT/ND; +BSx4 hyperactive   Extremities: R upper extremity contracted, 1+ pitting edema b/l up to knee.   Vascular: 2+ radial, DP/PT pulses B/L  Neurological: initially on vent, extubated successfully when brought to floors.   Patient AAOx3 and functionally quadriplegic    MEDICATIONS:  MEDICATIONS  (STANDING):  artificial  tears Solution 1 Drop(s) Both EYES four times a day  ascorbic acid 500 milliGRAM(s) Oral daily  atorvastatin 20 milliGRAM(s) Oral at bedtime  baclofen PF IntraThecal 120 MICROGram(s) IntraThecal every 24 hours  cholecalciferol 1000 Unit(s) Oral daily  ferrous    sulfate 325 milliGRAM(s) Oral daily  metoprolol tartrate 50 milliGRAM(s) Oral two times a day  morphine ER Tablet 15 milliGRAM(s) Oral every 8 hours  multivitamin/minerals 1 Tablet(s) Oral daily  oxybutynin 5 milliGRAM(s) Oral every 12 hours  pantoprazole    Tablet 40 milliGRAM(s) Oral every 12 hours  pantoprazole Infusion 8 mG/Hr (10 mL/Hr) IV Continuous <Continuous>  pregabalin 50 milliGRAM(s) Oral every 8 hours  senna 2 Tablet(s) Oral at bedtime  sodium chloride 0.45%. 1000 milliLiter(s) (80 mL/Hr) IV Continuous <Continuous>  sodium chloride 0.65% Nasal 1 Spray(s) Both Nostrils two times a day  zinc sulfate 220 milliGRAM(s) Oral daily    MEDICATIONS  (PRN):  acetaminophen   Tablet .. 650 milliGRAM(s) Oral every 6 hours PRN Moderate Pain (4 - 6)      ALLERGIES:  Allergies    No Known Allergies    Intolerances        LABS:                        8.1    9.09  )-----------( 183      ( 2021 06:40 )             26.8     -    148<H>  |  112<H>  |  11  ----------------------------<  75  3.8   |  22  |  0.76    Ca    7.9<L>      2021 06:40  Phos  2.6       Mg     2.1         TPro  5.6<L>  /  Alb  2.7<L>  /  TBili  0.4  /  DBili  x   /  AST  16  /  ALT  9<L>  /  AlkPhos  57      PT/INR - ( 2021 00:33 )   PT: 13.6 sec;   INR: 1.14          PTT - ( 2021 00:33 )  PTT:25.8 sec  Urinalysis Basic - ( 2021 03:44 )    Color: Yellow / Appearance: Clear / S.020 / pH: x  Gluc: x / Ketone: NEGATIVE  / Bili: Negative / Urobili: 0.2 E.U./dL   Blood: x / Protein: Trace mg/dL / Nitrite: NEGATIVE   Leuk Esterase: Small / RBC: < 5 /HPF / WBC 5-10 /HPF   Sq Epi: x / Non Sq Epi: 0-5 /HPF / Bacteria: Present /HPF      RADIOLOGY & ADDITIONAL TESTS: Reviewed.

## 2021-01-28 NOTE — PHARMACY COMMUNICATION NOTE - COMMENTS
Baclofen pump settings confirmed with nurse practitioner Juana at [facility]:    Dose: 120 mcg/day  Concentration: 2000 mcg/mL    Last refill: October 2020  Next refill: April 2021    Please call with any questions: [phone] (work cell) Baclofen pump settings confirmed with nurse practitioner Juana at [facility]:    Dose: 120 mcg/day  Concentration: 2000 mcg/mL    Last refill: October 2020  Next refill: first week of April 2021    Please call with any questions: [phone] (work cell)

## 2021-01-28 NOTE — PROGRESS NOTE ADULT - PROBLEM SELECTOR PLAN 9
F: s/p 4U PRBC; 1/2 normal saline at 80 cc/hr for 10 hr  E: replete PRN  N: NPO for EGD/colonoscopy     DVT ppx: SCDs, hold lovenox in setting of r/o GIB  Dispo:   FULL CODE

## 2021-01-28 NOTE — PROGRESS NOTE ADULT - ASSESSMENT
56 y/o male, poor historian w/ pmhx of MS, quadriplegia, iron deficiency anemia, HTN, HLD, bipolar disorder, sicca syndrome, polyneuropathy, chronic pain, pressure ulcers, mrsa infections, presenting from nursing home with Hb of 3.6.    PERI  -hemoglobin stable, s/p prbc transfusion  EGD performed on 1/27- gastritis ( biopsies taken), clean based duodenal ulcer in bulb ( Likely source of anemia)  Colonoscopy: poor prep, dark stool throughout colon. Scope reached cecum. No mass seen  -Follow up pathology  -PPI 40 mg po BID for 2 months  -Avoid NSAIDs  -Will need repeat EGD in 2 months to assess for healing  Resume colonoscopy surveillance schedule- given poor prep this colonoscopy could not assess for small polyps  Please notify GI if any signs of bleeding      54 y/o male, poor historian w/ pmhx of MS, quadriplegia, iron deficiency anemia, HTN, HLD, bipolar disorder, sicca syndrome, polyneuropathy, chronic pain, pressure ulcers, mrsa infections, presenting from nursing home with Hb of 3.6.    PERI  -hemoglobin stable, s/p prbc transfusion  EGD performed on 1/27- gastritis ( biopsies taken), clean based duodenal ulcer in bulb ( Likely source of anemia)  Colonoscopy: poor prep, dark brown stool throughout colon. Scope reached cecum. No mass seen, no signs of lower GIB  -Follow up pathology  -PPI 40 mg po BID for 2 months  -Avoid NSAIDs  -Will need repeat EGD in 2 months to assess for healing  Resume colonoscopy surveillance schedule- given poor prep this colonoscopy could not assess for small polyps  Please notify GI if any signs of bleeding

## 2021-01-28 NOTE — PROGRESS NOTE ADULT - PROBLEM SELECTOR PLAN 8
RESOLVED  -Initially c/o CP on adm, w/ EKG with ST depressions in I, avL, v2, v4-v6 with elevated troponin 0.03. No prior EKG to compare  - troponin thought likely elevated 2/2 to demand ischemia in the setting of severe anemia, plateaued on repeat.  -Pt's CP significantly improved/resolved following blood transfusions

## 2021-01-28 NOTE — PROGRESS NOTE ADULT - PROBLEM SELECTOR PLAN 9
F: s/p 4U PRBC; 1/2 normal saline at 80 cc/hr for 10 hr  E: replete PRN  N: clear liquid    DVT ppx: SCDs, hold lovenox in setting of r/o GIB  Dispo:   FULL CODE

## 2021-01-28 NOTE — PROGRESS NOTE ADULT - PROBLEM SELECTOR PLAN 3
Pt reports fever at nursing home, however afebrile in ED (Tmax of 100.2). States that he was started on antibiotics for unknown origin, was pancultured at NH. CXR demonstrates R hilar ?infiltrate. Patient without cough  - per nursing home records, pt was started on vanc 750 q12h and zosyn 3.375 g q8h for fever. Unclear source.  -Unclear why pt was put on those abx, and attempts for collateral from NH today unsuccessful; spoke to pt's HCP who mentioned that pt was being "treated for a UTI"; will trial of abx and assessment pt's vitals and white count and follow up the obtained cultures mentioned below. Has not been febrile while in hospital.   - Blood cx NGTD   - Ucx, LUE picc line cx  - Procalcitonin 0.50

## 2021-01-28 NOTE — PROGRESS NOTE ADULT - ASSESSMENT
Pt is a 54 y/o male, poor historian w/ pmhx of MS, quadriplegia, iron deficiency anemia, HTN, HLD, bipolar disorder, sicca syndrome, polyneuropathy, chronic pain, pressure ulcer, mrsa infection, presenting from nursing home with Hb of 3.6. Pt admitted for severe anemia 2/2 presumed GI bleed. S/p EGD 1/28 that showed gastritis with clean based duodenal bulb ulcer.

## 2021-01-28 NOTE — PROGRESS NOTE ADULT - PROBLEM SELECTOR PLAN 7
RESOLVED   - pt c/o of SOB and chest tightness likely 2/2 to anemia  - Was on NC, weaned to RA, satting high 90s-100%.   - CXR w/o acute infiltrates.   - low suspicion for covid pna or PE, f/u result. D-dimer wnl

## 2021-01-28 NOTE — CHART NOTE - NSCHARTNOTEFT_GEN_A_CORE
EGD and colonoscopy Report    EGD- gastritis ( biopsies taken), clean based duodenal ulcer in bulb    Colonoscopy: poor prep, dark stool throughout colon. Scope reached cecum. No mass seen    Plan:  PPI 40 mg po BID for 2 months  Avoid NSAIDs  Will need repeat EGD in 2 months to assess for healing  Follow up pathology  Resume colonoscopy surveillance schedule- given poor prep this colonoscopy could not assess for small polyps.? ?PPI 40 mg po BID for 2 months? ?  Please notify GI if any signs of bleeding EGD and colonoscopy Report    EGD- gastritis ( biopsies taken), clean based duodenal ulcer in bulb    Colonoscopy: poor prep, dark stool throughout colon. Scope reached cecum. No mass seen    Plan:  PPI 40 mg po BID for 2 months  Avoid NSAIDs  Will need repeat EGD in 2 months to assess for healing  Follow up pathology  Resume colonoscopy surveillance schedule- given poor prep this colonoscopy could not assess for small polyps.? ?PPI 40 mg po BID for 2 months? ?  Please notify GI if any signs of bleeding    Attendum Attending/GI  Agree with associated fellows note re workup of PERI in this patient with now established upper GI tract ulceration presumably 2/2 NSAIDS, delmer/o active bleed or lower GI pathology

## 2021-01-28 NOTE — DIETITIAN INITIAL EVALUATION ADULT. - PERTINENT LABORATORY DATA
Patient states she is still having b/p readings 140-150 over high 80-90's. She is concerned because she is to have surgery tomorrow.
Pt called and stated she was seen last week and would like to speak to a nurse, she is having surgery tomorrow and would like a call back about some medications and blood pressure readings.
Pt needs an answer today
Spoke to patient let her know per Dr. Wesley Reed to double the dose today and then check her b/p. Patient has a son that can check the b/p Manually tonight.
Na 148 (H), K/Mg/Phos WNL, , H/H 8.1/26.8%

## 2021-01-28 NOTE — PROGRESS NOTE ADULT - PROBLEM SELECTOR PLAN 5
- per nursing home records, pt on metoprolol tartrate 100 mg BID  - restarted s/p EGD    #HLD  - c/w atorvastatin 20 mg QHS

## 2021-01-28 NOTE — PROGRESS NOTE ADULT - ATTENDING COMMENTS
I spoke with anesthesia pre procedure regarding Mr. Morgan's extensive hx of MS. Agree with bringing to ICU post procedure intubated to assess for awakening, lung expansion, and ability to protect airway post EGD and colonoscopy.

## 2021-01-28 NOTE — PROGRESS NOTE ADULT - SUBJECTIVE AND OBJECTIVE BOX
Pt seen and examined at bedside.    PERTINENT REVIEW OF SYSTEMS:  CONSTITUTIONAL: No weakness, fevers or chills  HEENT: No visual changes; No vertigo or throat pain   GASTROINTESTINAL: No abdominal or epigastric pain. No nausea, vomiting, or hematemesis; No diarrhea or constipation. No melena or hematochezia.  NEUROLOGICAL: No numbness or weakness  SKIN: No itching, burning, rashes, or lesions     Allergies    No Known Allergies    Intolerances      MEDICATIONS:  MEDICATIONS  (STANDING):  artificial  tears Solution 1 Drop(s) Both EYES four times a day  ascorbic acid 500 milliGRAM(s) Oral daily  atorvastatin 20 milliGRAM(s) Oral at bedtime  baclofen PF IntraThecal 120 MICROGram(s) IntraThecal every 24 hours  cholecalciferol 1000 Unit(s) Oral daily  ferrous    sulfate 325 milliGRAM(s) Oral daily  metoprolol tartrate 50 milliGRAM(s) Oral two times a day  morphine ER Tablet 15 milliGRAM(s) Oral every 8 hours  multivitamin/minerals 1 Tablet(s) Oral daily  oxybutynin 5 milliGRAM(s) Oral every 12 hours  pantoprazole    Tablet 40 milliGRAM(s) Oral every 12 hours  pantoprazole Infusion 8 mG/Hr (10 mL/Hr) IV Continuous <Continuous>  pregabalin 50 milliGRAM(s) Oral every 8 hours  senna 2 Tablet(s) Oral at bedtime  sodium chloride 0.45%. 1000 milliLiter(s) (80 mL/Hr) IV Continuous <Continuous>  sodium chloride 0.65% Nasal 1 Spray(s) Both Nostrils two times a day  zinc sulfate 220 milliGRAM(s) Oral daily    MEDICATIONS  (PRN):  acetaminophen   Tablet .. 650 milliGRAM(s) Oral every 6 hours PRN Moderate Pain (4 - 6)    Vital Signs Last 24 Hrs  T(C): 36.7 (2021 14:22), Max: 36.8 (2021 17:14)  T(F): 98.1 (2021 14:22), Max: 98.2 (2021 17:14)  HR: 79 (2021 13:00) (56 - 88)  BP: 174/99 (2021 13:00) (103/62 - 174/99)  BP(mean): 129 (2021 13:00) (77 - 129)  RR: 13 (2021 13:00) (13 - 36)  SpO2: 100% (2021 13:00) (98% - 100%)     @ 07:01  -   @ 07:00  --------------------------------------------------------  IN: 2380 mL / OUT: 1050 mL / NET: 1330 mL      PHYSICAL EXAM:    General:; in no acute distress  HEENT: MMM, conjunctiva and sclera clear  Gastrointestinal: Soft non-tender non-distended; Normal bowel sounds; No hepatosplenomegaly. No rebound or guarding  Skin: Warm and dry. No obvious rash    LABS:                        8.1    9.09  )-----------( 183      ( 2021 06:40 )             26.8         148<H>  |  112<H>  |  11  ----------------------------<  75  3.8   |  22  |  0.76    Ca    7.9<L>      2021 06:40  Phos  2.6       Mg     2.1         TPro  5.6<L>  /  Alb  2.7<L>  /  TBili  0.4  /  DBili  x   /  AST  16  /  ALT  9<L>  /  AlkPhos  57      PT/INR - ( 2021 00:33 )   PT: 13.6 sec;   INR: 1.14          PTT - ( 2021 00:33 )  PTT:25.8 sec      Urinalysis Basic - ( 2021 03:44 )    Color: Yellow / Appearance: Clear / S.020 / pH: x  Gluc: x / Ketone: NEGATIVE  / Bili: Negative / Urobili: 0.2 E.U./dL   Blood: x / Protein: Trace mg/dL / Nitrite: NEGATIVE   Leuk Esterase: Small / RBC: < 5 /HPF / WBC 5-10 /HPF   Sq Epi: x / Non Sq Epi: 0-5 /HPF / Bacteria: Present /HPF                Culture - Urine (collected 2021 05:16)  Source: .Urine Catheterized  Preliminary Report (2021 13:08):    2,000 CFU/ml Pseudomonas aeruginosa    Susceptibility to follow.    60,000 CFU/ml Candida albicans    Culture in progress    Culture - Blood (collected 2021 01:37)  Source: .Blood Blood-Peripheral  Preliminary Report (2021 02:00):    No growth at 1 day.    Culture - Blood (collected 2021 01:37)  Source: .Blood Blood-Peripheral  Preliminary Report (2021 02:00):    No growth at 1 day.      RADIOLOGY & ADDITIONAL STUDIES:

## 2021-01-28 NOTE — PROGRESS NOTE ADULT - PROBLEM SELECTOR PLAN 2
Possible upper GI bleed   - Per ED rectal exam, ?melena, black secretions. positive FOBT.  - Transfused as above  - S/p endoscopy 1/28 which showed clean based duodenal bulb ulcer  - Prep not sufficient for colonoscopy, no mass seen - will need repeat outpatient likely  - c/w PPI 40mg PO BID  - maintain active type and screen  - maintain IV access

## 2021-01-28 NOTE — PROGRESS NOTE ADULT - PROBLEM SELECTOR PLAN 1
IMPROVING  Patient with hx of PERI, poor historian but reports history of "low blood levels" Presented w/ 3.5 and FOBT positive in ED.   -S/P 4u prbc on adm, with improvement in hb to 8.0. 1/28 AM hb 8.1  - B12 1339, Folate > 20  - Iron studies showing PERI  - Transfuse Hb >7  - GI workup as detailed below.

## 2021-01-28 NOTE — DIETITIAN INITIAL EVALUATION ADULT. - OTHER INFO
54 yo/male with PMHx MS, quadraplegia, PERI, HTN, HLD, bipolar d/o, polyneuropathy, chronic pain, MRSA infections, admitted from NH with hemoglobin of 3.6. EGD performed this AM, which revealed gastritis (biopsies taken), clean based duodenal ulcer in bulb (Likely source of anemia, per MD note). Colonoscopy: poor prep, dark brown stool throughout colon. Scope reached cecum. No mass seen, no signs of lower GIB. Pt remained intubated post-op d/t c/f difficult extubation 2/2 MS. Pt successfully extubated in MICU. Pt seen in room, awake, alert, pleasant. Breathing comfortably on NC. Slightly slow to respond to questions. He endorsed that he usually has a good appetite but is a picky eater. He endorsed only liking foods "that are cooked well". He enjoys Italian food and Chinese food the most. NKFA. Currently NPO, and endorsed being very hungry. No complaints of N/V; sometimes is constipated 2/2 medications. Did have multiple BMs overnight 2/2 bowel prep. He denied having difficulty chewing or swallowing. Skin noted with sacrum unstageable pressure injury and heel unstageable pressure injury. No edema present. No pain endorsed at this time. He reported that his weight is fairly stable and may fluctuate 1-2# during the month. NFPE insignificant. Discussed diet advancement per medical team. No diet education appropriate at this time but discussed addition of ONS, which he was amenable to. Will continue to follow per RD protocol.

## 2021-01-29 LAB
-  AZTREONAM: SIGNIFICANT CHANGE UP
-  CEFEPIME: SIGNIFICANT CHANGE UP
-  CIPROFLOXACIN: SIGNIFICANT CHANGE UP
-  GENTAMICIN: SIGNIFICANT CHANGE UP
-  PIPERACILLIN/TAZOBACTAM: SIGNIFICANT CHANGE UP
-  TOBRAMYCIN: SIGNIFICANT CHANGE UP
ALBUMIN SERPL ELPH-MCNC: 2.6 G/DL — LOW (ref 3.3–5)
ALP SERPL-CCNC: 62 U/L — SIGNIFICANT CHANGE UP (ref 40–120)
ALT FLD-CCNC: 11 U/L — SIGNIFICANT CHANGE UP (ref 10–45)
ANION GAP SERPL CALC-SCNC: 14 MMOL/L — SIGNIFICANT CHANGE UP (ref 5–17)
APTT BLD: 38.3 SEC — HIGH (ref 27.5–35.5)
AST SERPL-CCNC: 18 U/L — SIGNIFICANT CHANGE UP (ref 10–40)
BASOPHILS # BLD AUTO: 0.02 K/UL — SIGNIFICANT CHANGE UP (ref 0–0.2)
BASOPHILS NFR BLD AUTO: 0.2 % — SIGNIFICANT CHANGE UP (ref 0–2)
BILIRUB SERPL-MCNC: 0.4 MG/DL — SIGNIFICANT CHANGE UP (ref 0.2–1.2)
BLD GP AB SCN SERPL QL: NEGATIVE — SIGNIFICANT CHANGE UP
BUN SERPL-MCNC: 8 MG/DL — SIGNIFICANT CHANGE UP (ref 7–23)
CALCIUM SERPL-MCNC: 8 MG/DL — LOW (ref 8.4–10.5)
CHLORIDE SERPL-SCNC: 110 MMOL/L — HIGH (ref 96–108)
CO2 SERPL-SCNC: 21 MMOL/L — LOW (ref 22–31)
CREAT SERPL-MCNC: 0.69 MG/DL — SIGNIFICANT CHANGE UP (ref 0.5–1.3)
CULTURE RESULTS: SIGNIFICANT CHANGE UP
EOSINOPHIL # BLD AUTO: 0.25 K/UL — SIGNIFICANT CHANGE UP (ref 0–0.5)
EOSINOPHIL NFR BLD AUTO: 2.7 % — SIGNIFICANT CHANGE UP (ref 0–6)
GLUCOSE SERPL-MCNC: 66 MG/DL — LOW (ref 70–99)
HCT VFR BLD CALC: 23.9 % — LOW (ref 39–50)
HCT VFR BLD CALC: 24.3 % — LOW (ref 39–50)
HGB BLD-MCNC: 7.1 G/DL — LOW (ref 13–17)
HGB BLD-MCNC: 7.5 G/DL — LOW (ref 13–17)
IMM GRANULOCYTES NFR BLD AUTO: 0.8 % — SIGNIFICANT CHANGE UP (ref 0–1.5)
INR BLD: 1.12 — SIGNIFICANT CHANGE UP (ref 0.88–1.16)
LYMPHOCYTES # BLD AUTO: 1.2 K/UL — SIGNIFICANT CHANGE UP (ref 1–3.3)
LYMPHOCYTES # BLD AUTO: 13.1 % — SIGNIFICANT CHANGE UP (ref 13–44)
MAGNESIUM SERPL-MCNC: 1.6 MG/DL — SIGNIFICANT CHANGE UP (ref 1.6–2.6)
MCHC RBC-ENTMCNC: 28.6 PG — SIGNIFICANT CHANGE UP (ref 27–34)
MCHC RBC-ENTMCNC: 29.7 GM/DL — LOW (ref 32–36)
MCHC RBC-ENTMCNC: 30.2 PG — SIGNIFICANT CHANGE UP (ref 27–34)
MCHC RBC-ENTMCNC: 30.9 GM/DL — LOW (ref 32–36)
MCV RBC AUTO: 96.4 FL — SIGNIFICANT CHANGE UP (ref 80–100)
MCV RBC AUTO: 98 FL — SIGNIFICANT CHANGE UP (ref 80–100)
METHOD TYPE: SIGNIFICANT CHANGE UP
METHOD TYPE: SIGNIFICANT CHANGE UP
MONOCYTES # BLD AUTO: 0.58 K/UL — SIGNIFICANT CHANGE UP (ref 0–0.9)
MONOCYTES NFR BLD AUTO: 6.3 % — SIGNIFICANT CHANGE UP (ref 2–14)
NEUTROPHILS # BLD AUTO: 7.03 K/UL — SIGNIFICANT CHANGE UP (ref 1.8–7.4)
NEUTROPHILS NFR BLD AUTO: 76.9 % — SIGNIFICANT CHANGE UP (ref 43–77)
NRBC # BLD: 0 /100 WBCS — SIGNIFICANT CHANGE UP (ref 0–0)
NRBC # BLD: 0 /100 WBCS — SIGNIFICANT CHANGE UP (ref 0–0)
ORGANISM # SPEC MICROSCOPIC CNT: SIGNIFICANT CHANGE UP
PHOSPHATE SERPL-MCNC: 3.1 MG/DL — SIGNIFICANT CHANGE UP (ref 2.5–4.5)
PLATELET # BLD AUTO: 166 K/UL — SIGNIFICANT CHANGE UP (ref 150–400)
PLATELET # BLD AUTO: 179 K/UL — SIGNIFICANT CHANGE UP (ref 150–400)
POTASSIUM SERPL-MCNC: 3.8 MMOL/L — SIGNIFICANT CHANGE UP (ref 3.5–5.3)
POTASSIUM SERPL-SCNC: 3.8 MMOL/L — SIGNIFICANT CHANGE UP (ref 3.5–5.3)
PROT SERPL-MCNC: 5.5 G/DL — LOW (ref 6–8.3)
PROTHROM AB SERPL-ACNC: 13.4 SEC — SIGNIFICANT CHANGE UP (ref 10.6–13.6)
RBC # BLD: 2.48 M/UL — LOW (ref 4.2–5.8)
RBC # BLD: 2.48 M/UL — LOW (ref 4.2–5.8)
RBC # FLD: 18.9 % — HIGH (ref 10.3–14.5)
RBC # FLD: 19.1 % — HIGH (ref 10.3–14.5)
RH IG SCN BLD-IMP: POSITIVE — SIGNIFICANT CHANGE UP
SODIUM SERPL-SCNC: 145 MMOL/L — SIGNIFICANT CHANGE UP (ref 135–145)
SPECIMEN SOURCE: SIGNIFICANT CHANGE UP
SURGICAL PATHOLOGY STUDY: SIGNIFICANT CHANGE UP
WBC # BLD: 8.92 K/UL — SIGNIFICANT CHANGE UP (ref 3.8–10.5)
WBC # BLD: 9.15 K/UL — SIGNIFICANT CHANGE UP (ref 3.8–10.5)
WBC # FLD AUTO: 8.92 K/UL — SIGNIFICANT CHANGE UP (ref 3.8–10.5)
WBC # FLD AUTO: 9.15 K/UL — SIGNIFICANT CHANGE UP (ref 3.8–10.5)

## 2021-01-29 PROCEDURE — 99233 SBSQ HOSP IP/OBS HIGH 50: CPT | Mod: GC

## 2021-01-29 RX ORDER — MAGNESIUM SULFATE 500 MG/ML
2 VIAL (ML) INJECTION ONCE
Refills: 0 | Status: COMPLETED | OUTPATIENT
Start: 2021-01-29 | End: 2021-01-29

## 2021-01-29 RX ORDER — METOPROLOL TARTRATE 50 MG
50 TABLET ORAL ONCE
Refills: 0 | Status: COMPLETED | OUTPATIENT
Start: 2021-01-29 | End: 2021-01-29

## 2021-01-29 RX ORDER — METOPROLOL TARTRATE 50 MG
100 TABLET ORAL EVERY 12 HOURS
Refills: 0 | Status: DISCONTINUED | OUTPATIENT
Start: 2021-01-30 | End: 2021-02-02

## 2021-01-29 RX ORDER — POTASSIUM CHLORIDE 20 MEQ
20 PACKET (EA) ORAL ONCE
Refills: 0 | Status: COMPLETED | OUTPATIENT
Start: 2021-01-29 | End: 2021-01-29

## 2021-01-29 RX ADMIN — Medication 500 MILLIGRAM(S): at 12:06

## 2021-01-29 RX ADMIN — Medication 20 MILLIEQUIVALENT(S): at 12:06

## 2021-01-29 RX ADMIN — Medication 50 MILLIGRAM(S): at 09:38

## 2021-01-29 RX ADMIN — Medication 50 GRAM(S): at 12:01

## 2021-01-29 RX ADMIN — Medication 1 SPRAY(S): at 05:27

## 2021-01-29 RX ADMIN — Medication 1 TABLET(S): at 12:05

## 2021-01-29 RX ADMIN — Medication 50 MILLIGRAM(S): at 17:52

## 2021-01-29 RX ADMIN — Medication 1 DROP(S): at 05:28

## 2021-01-29 RX ADMIN — Medication 5 MILLIGRAM(S): at 05:28

## 2021-01-29 RX ADMIN — Medication 50 MILLIGRAM(S): at 05:27

## 2021-01-29 RX ADMIN — Medication 5 MILLIGRAM(S): at 17:52

## 2021-01-29 RX ADMIN — Medication 1 SPRAY(S): at 17:52

## 2021-01-29 RX ADMIN — Medication 1 DROP(S): at 17:52

## 2021-01-29 RX ADMIN — Medication 1 DROP(S): at 12:05

## 2021-01-29 RX ADMIN — Medication 325 MILLIGRAM(S): at 12:06

## 2021-01-29 RX ADMIN — Medication 1000 UNIT(S): at 12:02

## 2021-01-29 RX ADMIN — PANTOPRAZOLE SODIUM 40 MILLIGRAM(S): 20 TABLET, DELAYED RELEASE ORAL at 17:52

## 2021-01-29 RX ADMIN — ZINC SULFATE TAB 220 MG (50 MG ZINC EQUIVALENT) 220 MILLIGRAM(S): 220 (50 ZN) TAB at 12:05

## 2021-01-29 RX ADMIN — ATORVASTATIN CALCIUM 20 MILLIGRAM(S): 80 TABLET, FILM COATED ORAL at 23:38

## 2021-01-29 RX ADMIN — Medication 50 MILLIGRAM(S): at 17:06

## 2021-01-29 RX ADMIN — SENNA PLUS 2 TABLET(S): 8.6 TABLET ORAL at 23:38

## 2021-01-29 RX ADMIN — MORPHINE SULFATE 15 MILLIGRAM(S): 50 CAPSULE, EXTENDED RELEASE ORAL at 23:38

## 2021-01-29 RX ADMIN — PANTOPRAZOLE SODIUM 40 MILLIGRAM(S): 20 TABLET, DELAYED RELEASE ORAL at 05:28

## 2021-01-29 RX ADMIN — MORPHINE SULFATE 15 MILLIGRAM(S): 50 CAPSULE, EXTENDED RELEASE ORAL at 05:28

## 2021-01-29 RX ADMIN — MORPHINE SULFATE 15 MILLIGRAM(S): 50 CAPSULE, EXTENDED RELEASE ORAL at 14:21

## 2021-01-29 RX ADMIN — Medication 1 DROP(S): at 23:37

## 2021-01-29 RX ADMIN — Medication 50 MILLIGRAM(S): at 23:38

## 2021-01-29 NOTE — PROGRESS NOTE ADULT - PROBLEM SELECTOR PLAN 3
Pt reports fever at nursing home, however afebrile in ED (Tmax of 100.2). States that he was started on antibiotics for unknown origin, was pancultured at NH. CXR demonstrates R hilar ?infiltrate. Patient without cough  - per nursing home records, pt was started on vanc 750 q12h and zosyn 3.375 g q8h for fever. Unclear source.  -Unclear why pt was put on those abx, and attempts for collateral from NH today unsuccessful; spoke to pt's HCP who mentioned that pt was being "treated for a UTI"; will trial of abx and assessment pt's vitals and white count and follow up the obtained cultures mentioned below. Has not been febrile while in hospital.   - Blood cx NGTD   - Ucx, LUE picc line cx  - Procalcitonin 0.50 Pt reports fever at nursing home, however afebrile in ED (Tmax of 100.2). States that he was started on antibiotics for unknown origin, was pancultured at NH. CXR demonstrates R hilar ?infiltrate. Patient without cough  - per nursing home records, pt was started on vanc 750 q12h and zosyn 3.375 g q8h for fever. Unclear source.  -Unclear why pt was put on those abx, and attempts for collateral from NH  unsuccessful; spoke to pt's HCP who mentioned that pt was being "treated for a UTI"; will trial of abx and assessment pt's vitals and white count and follow up the obtained cultures mentioned below. Has not been febrile while in hospital.   - Blood cx NGTD   - Ucx 2000 CFU psudomonas, LUE picc line cx   - Procalcitonin 0.50 AFEBRILE- CONTINUING TO MONITOR  Pt reports fever at nursing home, however afebrile in ED (Tmax of 100.2). States that he was started on antibiotics for unknown origin, was pancultured at NH. CXR demonstrates R hilar ?infiltrate. Patient without cough  - per nursing home records, pt was started on vanc 750 q12h and zosyn 3.375 g q8h for fever. Unclear source.  -Unclear why pt was put on those abx, and attempts for collateral from NH  unsuccessful; spoke to pt's HCP who mentioned that pt was being "treated for a UTI"; will trial of abx and assessment pt's vitals and white count and follow up the obtained cultures mentioned below. Has not been febrile while in hospital.   - Blood cx NGTD   - Ucx 2000 CFU psudomonas, LUE picc line cx   - Procalcitonin 0.50

## 2021-01-29 NOTE — PROGRESS NOTE ADULT - SUBJECTIVE AND OBJECTIVE BOX
************* TRANSFER NOTE from [facility] to 7[facility] *************    Hospital course: 55 year old male with MS, quadriplegia, PERI, htn, hld, bipolar disorder, SICCA, polyneuropathy, pressure ulcers, and chronic pain who presented to [facility] from his nursing home with chest pain, found to have a Hg of 3.6. Patient denied any melanotic bowel movements or NSAID use; however, nursing home reportedly appreciated some. He was given 4 units of pRBC with good response in Hg. Completed his prep and underwent endoscopy/colonoscopy 1/28 showing gastritis ( biopsies taken), clean based duodenal ulcer in bulb ( Likely source of anemia), no signs of lower GIB. Required brief MICU stay following procedure for observation and extubation (i/s/o c/f potential difficult extubation due to severe MS and baclofen pump pre-procedure), s/p extubation and monitoring at [facility], no new melena and hb stable. Ready for RMF step-down, w/ diet advanced to mechan. soft. Awaiting placement at his nursing home.     SUBJECTIVE / INTERVAL HPI: Patient seen and examined at bedside. Pt AOx3, conversing in full sentences, denies chest pain, SOB, ab pain, n/v.     VITAL SIGNS:  Vital Signs Last 24 Hrs  T(C): 36.6 (29 Jan 2021 14:05), Max: 37.1 (29 Jan 2021 05:50)  T(F): 97.9 (29 Jan 2021 14:05), Max: 98.8 (29 Jan 2021 05:50)  HR: 66 (29 Jan 2021 13:48) (66 - 87)  BP: 172/86 (29 Jan 2021 13:48) (116/57 - 186/93)  BP(mean): 128 (29 Jan 2021 13:44) (81 - 133)  RR: 16 (29 Jan 2021 13:48) (14 - 18)  SpO2: 100% (29 Jan 2021 13:48) (97% - 100%)    PHYSICAL EXAM:    General: laying supine, NAD. Warm heating blanket underneath, on specific bed for avoiding pressure ulcers.   HEENT: NC/AT; PERRL, anicteric sclera; MMM, interestingly no conjunctival pallor  Cardiovascular: +S1/S2; RRR  Respiratory: Inspiratory effort/excursions not great, but no crackles/wheezes, rhonchi heard.   Gastrointestinal: soft, NT/ND; +BSx4 n/l, surgical scar present baclofen pump palpable in Rside of abd adjacent to scar  Extremities: R upper extremity contracted, fingers flexed bilaterally, extremities cool but not cold; 1+ pitting edema b/l up to knee.   Vascular: 2+ radial, DP/PT pulses B/L  Neurological: AAOx3, speech is slow but clear enough to understand when leaning in; functionally quadriplegic; Pt not participating in strength assessment as he said that he wouldn't be able to do it if he tried. Sensation intact b/l, equal.     MEDICATIONS:  MEDICATIONS  (STANDING):  artificial  tears Solution 1 Drop(s) Both EYES four times a day  ascorbic acid 500 milliGRAM(s) Oral daily  atorvastatin 20 milliGRAM(s) Oral at bedtime  baclofen PF IntraThecal 120 MICROGram(s) IntraThecal every 24 hours  cholecalciferol 1000 Unit(s) Oral daily  ferrous    sulfate 325 milliGRAM(s) Oral daily  metoprolol tartrate 50 milliGRAM(s) Oral two times a day  morphine ER Tablet 15 milliGRAM(s) Oral every 8 hours  multivitamin/minerals 1 Tablet(s) Oral daily  oxybutynin 5 milliGRAM(s) Oral every 12 hours  pantoprazole    Tablet 40 milliGRAM(s) Oral every 12 hours  pregabalin 50 milliGRAM(s) Oral every 8 hours  senna 2 Tablet(s) Oral at bedtime  sodium chloride 0.45%. 1000 milliLiter(s) (80 mL/Hr) IV Continuous <Continuous>  sodium chloride 0.65% Nasal 1 Spray(s) Both Nostrils two times a day  zinc sulfate 220 milliGRAM(s) Oral daily    MEDICATIONS  (PRN):  acetaminophen   Tablet .. 650 milliGRAM(s) Oral every 6 hours PRN Moderate Pain (4 - 6)      ALLERGIES:  Allergies    No Known Allergies    Intolerances        LABS:                        7.5    8.92  )-----------( 166      ( 29 Jan 2021 09:45 )             24.3     01-29    145  |  110<H>  |  8   ----------------------------<  66<L>  3.8   |  21<L>  |  0.69    Ca    8.0<L>      29 Jan 2021 08:11  Phos  3.1     01-29  Mg     1.6     01-29    TPro  5.5<L>  /  Alb  2.6<L>  /  TBili  0.4  /  DBili  x   /  AST  18  /  ALT  11  /  AlkPhos  62  01-29    PT/INR - ( 29 Jan 2021 08:11 )   PT: 13.4 sec;   INR: 1.12          PTT - ( 29 Jan 2021 08:11 )  PTT:38.3 sec    CAPILLARY BLOOD GLUCOSE      POCT Blood Glucose.: 102 mg/dL (27 Jan 2021 17:30)      RADIOLOGY & ADDITIONAL TESTS: Reviewed. ************* TRANSFER NOTE from 7[facility] to 7[facility] *************    Hospital course: 55 year old male with MS, quadriplegia, PERI, htn, hld, bipolar disorder, SICCA, polyneuropathy, pressure ulcers, and chronic pain who presented to [facility] from his nursing home with chest pain, found to have a Hg of 3.6. Patient denied any melanotic bowel movements or NSAID use; however, nursing home reportedly appreciated some. He was given 4 units of pRBC with good response in Hg. Completed his prep and underwent endoscopy/colonoscopy 1/28 showing gastritis ( biopsies taken), clean based duodenal ulcer in bulb ( Likely source of anemia), no signs of lower GIB. Required brief MICU stay following procedure for observation and extubation (i/s/o c/f potential difficult extubation due to severe MS and baclofen pump pre-procedure), s/p extubation and monitoring at [facility], no new melena and hb stable. Ready for RMF step-down, w/ diet advanced to mechan. soft. Awaiting placement at his nursing home.     SUBJECTIVE / INTERVAL HPI: Patient seen and examined at bedside. Pt AOx3, conversing in full sentences, denies chest pain, SOB, ab pain, n/v.     VITAL SIGNS:  Vital Signs Last 24 Hrs  T(C): 36.6 (29 Jan 2021 14:05), Max: 37.1 (29 Jan 2021 05:50)  T(F): 97.9 (29 Jan 2021 14:05), Max: 98.8 (29 Jan 2021 05:50)  HR: 66 (29 Jan 2021 13:48) (66 - 87)  BP: 172/86 (29 Jan 2021 13:48) (116/57 - 186/93)  BP(mean): 128 (29 Jan 2021 13:44) (81 - 133)  RR: 16 (29 Jan 2021 13:48) (14 - 18)  SpO2: 100% (29 Jan 2021 13:48) (97% - 100%)    PHYSICAL EXAM:    General: laying supine, NAD. Warm heating blanket underneath, on specific bed for avoiding pressure ulcers.   HEENT: NC/AT; PERRL, anicteric sclera; MMM, interestingly no conjunctival pallor  Cardiovascular: +S1/S2; RRR  Respiratory: Inspiratory effort/excursions not great, but no crackles/wheezes, rhonchi heard.   Gastrointestinal: soft, NT/ND; +BSx4 n/l, surgical scar present baclofen pump palpable in Rside of abd adjacent to scar  Extremities: R upper extremity contracted, fingers flexed bilaterally, extremities cool but not cold; 1+ pitting edema b/l up to knee.   Vascular: 2+ radial, DP/PT pulses B/L  Neurological: AAOx3, speech is slow but clear enough to understand when leaning in; functionally quadriplegic; Pt not participating in strength assessment as he said that he wouldn't be able to do it if he tried. Sensation intact b/l, equal.     MEDICATIONS:  MEDICATIONS  (STANDING):  artificial  tears Solution 1 Drop(s) Both EYES four times a day  ascorbic acid 500 milliGRAM(s) Oral daily  atorvastatin 20 milliGRAM(s) Oral at bedtime  baclofen PF IntraThecal 120 MICROGram(s) IntraThecal every 24 hours  cholecalciferol 1000 Unit(s) Oral daily  ferrous    sulfate 325 milliGRAM(s) Oral daily  metoprolol tartrate 50 milliGRAM(s) Oral two times a day  morphine ER Tablet 15 milliGRAM(s) Oral every 8 hours  multivitamin/minerals 1 Tablet(s) Oral daily  oxybutynin 5 milliGRAM(s) Oral every 12 hours  pantoprazole    Tablet 40 milliGRAM(s) Oral every 12 hours  pregabalin 50 milliGRAM(s) Oral every 8 hours  senna 2 Tablet(s) Oral at bedtime  sodium chloride 0.45%. 1000 milliLiter(s) (80 mL/Hr) IV Continuous <Continuous>  sodium chloride 0.65% Nasal 1 Spray(s) Both Nostrils two times a day  zinc sulfate 220 milliGRAM(s) Oral daily    MEDICATIONS  (PRN):  acetaminophen   Tablet .. 650 milliGRAM(s) Oral every 6 hours PRN Moderate Pain (4 - 6)      ALLERGIES:  Allergies    No Known Allergies    Intolerances        LABS:                        7.5    8.92  )-----------( 166      ( 29 Jan 2021 09:45 )             24.3     01-29    145  |  110<H>  |  8   ----------------------------<  66<L>  3.8   |  21<L>  |  0.69    Ca    8.0<L>      29 Jan 2021 08:11  Phos  3.1     01-29  Mg     1.6     01-29    TPro  5.5<L>  /  Alb  2.6<L>  /  TBili  0.4  /  DBili  x   /  AST  18  /  ALT  11  /  AlkPhos  62  01-29    PT/INR - ( 29 Jan 2021 08:11 )   PT: 13.4 sec;   INR: 1.12          PTT - ( 29 Jan 2021 08:11 )  PTT:38.3 sec    CAPILLARY BLOOD GLUCOSE      POCT Blood Glucose.: 102 mg/dL (27 Jan 2021 17:30)      RADIOLOGY & ADDITIONAL TESTS: Reviewed.

## 2021-01-29 NOTE — ADVANCED PRACTICE NURSE CONSULT - REASON FOR CONSULT
[facility] nurse consult to assess pressure injuries that were present on admission. The patient is a 54 y/o male, poor historian w/ pmhx of MS, quadriplegia, iron deficiency anemia, HTN, HLD, bipolar disorder, sicca syndrome, polyneuropathy, chronic pain, pressure ulcers, mrsa infections, presented from nursing home with Hb of 3.6. Pt reported complaints of intermittent chest tightness and SOB for past few days.

## 2021-01-29 NOTE — ADVANCED PRACTICE NURSE CONSULT - ASSESSMENT
The patient presented with pressure injuries on admission.  1. Sacral stage 4 pressure injury measuring 4.5 cm x 2 cm x 3 cm with circumferential undermining with deepest undermining at 12 o'clock of 3 cm. Wound draining moderate amount of serosanguinous exudate.   2. Left heel unstageable pressure injury with dry scab covering measuring 1 cm x 1 cm.   Healed pressure injury on left ischium.   The patient requires 2 person assist to turn in bed. Pillows being used to turn and reposition the patient and offload his heel. MAAME Valenzuela present and assisted during assessment.   The patient lying on an Envella air fluidized bed. Patient reported the bed was uncomfortable and he would prefer a regular bed. [facility] requested pick-up of the Envella bed and informed house staff Dr Mendes of the same.

## 2021-01-29 NOTE — PROGRESS NOTE ADULT - PROBLEM SELECTOR PLAN 9
F: s/p 4U PRBC; 1/2 normal saline at 80 cc/hr for 10 hr  E: replete PRN  N: clear liquid    DVT ppx: SCDs, hold lovenox in setting of r/o GIB  Dispo:   FULL CODE F: s/p 4U PRBC  E: replete PRN  N: clear liquid    DVT ppx: SCDs, hold lovenox in setting of r/o GIB  Dispo:   FULL CODE

## 2021-01-29 NOTE — PROGRESS NOTE ADULT - PROBLEM SELECTOR PLAN 3
AFEBRILE- CONTINUING TO MONITOR  Pt reports fever at nursing home, however afebrile in ED (Tmax of 100.2). States that he was started on antibiotics for unknown origin, was pancultured at NH. CXR demonstrates R hilar ?infiltrate. Patient without cough  - per nursing home records, pt was started on vanc 750 q12h and zosyn 3.375 g q8h for fever. Unclear source.  -Unclear why pt was put on those abx, and attempts for collateral from NH  unsuccessful; spoke to pt's HCP who mentioned that pt was being "treated for a UTI"; will trial of abx and assessment pt's vitals and white count and follow up the obtained cultures mentioned below. Has not been febrile while in hospital.   - Blood cx NGTD   - Ucx 2000 CFU psudomonas, LUE picc line cx   - Procalcitonin 0.50 AFEBRILE- CONTINUING TO MONITOR  Pt reports fever at nursing home, however afebrile in ED (Tmax of 100.2). States that he was started on antibiotics for unknown origin, was pancultured at NH. CXR demonstrates R hilar ?infiltrate. Patient without cough  - per nursing home records, pt was started on vanc 750 q12h and zosyn 3.375 g q8h for fever. Unclear source.  -Unclear why pt was put on those abx, and attempts for collateral from NH  unsuccessful; spoke to pt's HCP who mentioned that pt was being "treated for a UTI"; will trial of abx and assessment pt's vitals and white count and follow up the obtained cultures mentioned below. Has not been febrile while in hospital.   - Blood cx NGTD   - Ucx 2000 CFU psudomonas, no need for abx for now  - Procalcitonin 0.50

## 2021-01-29 NOTE — PROGRESS NOTE ADULT - ATTENDING COMMENTS
p/w chest pain found to have symptomatic anemia 2/2 GIB (gastric ulcer) on EGD/colo s/p MICU then 7L, now here for 7L - anticipate dispo back to [facility]  PICC in place in E    #Blood loss anemia due to GIB (gastritis and duodenal ulcer)  #MS - c/w baclofen pump, lyrica, home MS Contin 15 q8h  #Neurogenic bladder - c/w oxybutynin  #Functional Quadriplegia  #HLD  #HTN - above target  --c/w PPI BID x8wk  --No NSAIDs, EtOH; H Pylori negative  --Increase lopressor back to home 100 BID  --Will need to f/u w GI for repeat Scope  --Will need to have PICC removed prior to DC    DISPO: [facility] ([facility]) - anticipate DC in AM if hgb remains stable 55M w MS c/b functional quadriplegia, neurogenic bladder, HLD, HTN p/w chest pain found to have symptomatic anemia 2/2 GIB (gastric ulcer) on EGD/colo s/p MICU then 7L, now here for 7L - anticipate dispo back to [facility]    Pt seen and examined. States he feels well. Eating wo N/V/Abd pain. No chest pain, dyspnea. PICC in place in LUE. Exam also shows soft abd, NABS, non-tender, Baclofen w well healed scar in RLQ. pt with 2/5 strength in RUE; 1/5 in other extremities, contractures present in BUE. A/O x3, conversant and appropriate    #Blood loss anemia due to GIB (gastritis and duodenal ulcer) - improved; currently stable 7.5 from 7.1  #MS - c/w baclofen pump, lyrica, home MS Contin 15 q8h  #Neurogenic bladder - c/w oxybutynin  #Functional Quadriplegia  #HLD  #HTN - above target  --c/w PPI BID x8wk  --No NSAIDs, EtOH; H Pylori negative  --Increase lopressor back to home 100 BID  --Will need to f/u w GI for repeat Scope  --Will need to have PICC removed prior to DC    DISPO: [facility] ([facility]) - anticipate DC in AM if hgb remains stable

## 2021-01-29 NOTE — PROGRESS NOTE ADULT - PROBLEM SELECTOR PLAN 2
Possible upper GI bleed   - Per ED rectal exam, ?melena, black secretions. positive FOBT.  - Transfused as above  - S/p endoscopy 1/28 which showed clean based duodenal bulb ulcer  - Prep not sufficient for colonoscopy, no mass seen - will need repeat outpatient likely  - c/w PPI 40mg PO BID  - maintain active type and screen  - maintain IV access Endoscopy 1/28 showed clean based duodenal bulb ulcer which may be source of UGIB  - Prep not sufficient for colonoscopy, no mass seen - will need repeat outpatient likely  - c/w PPI 40mg PO BID  - maintain active type and screen  - maintain IV access RESOLVED  Endoscopy 1/28 showed clean based duodenal bulb ulcer which may be source of UGIB  - Prep not sufficient for colonoscopy, no mass seen - will need repeat outpatient likely  - c/w PPI 40mg PO BID  - maintain active type and screen  - maintain IV access

## 2021-01-29 NOTE — PROGRESS NOTE ADULT - PROBLEM SELECTOR PLAN 9
F: s/p 4U PRBC  E: replete PRN  N: clear liquid    DVT ppx: SCDs, hold lovenox in setting of r/o GIB  Dispo:   FULL CODE

## 2021-01-29 NOTE — PROGRESS NOTE ADULT - SUBJECTIVE AND OBJECTIVE BOX
IN PROGRESS IN PROGRESS    Hospital Course:  55 year old male with MS, quadriplegia, PERI, htn, hld, bipolar disorder, SICCA, polyneuropathy, pressure ulcers, and chronic pain who presented to [facility] from his nursing home for a Hg of 3.6. Patient denied any melanotic bowel movements or NSAID use; however, nursing home reportedly appreciated some. He was given 4 units of pRBC with good response in Hg. Completed his prep and underwent endoscopy/colonoscopy 1/28. Anesthesia had already been made aware of the possibility of difficult extubation due to severe MS and baclofen pump pre-procedure. Required brief MICU stay following procedure for observation and extubation, then transferred back to [facility], with no issues overnight, no new melena and hb stable. Ready for F step-down.  IN PROGRESS    Hospital Course:  55 year old male with MS, quadriplegia, PERI, htn, hld, bipolar disorder, SICCA, polyneuropathy, pressure ulcers, and chronic pain who presented to [facility] from his nursing home for a Hg of 3.6. Patient denied any melanotic bowel movements or NSAID use; however, nursing home reportedly appreciated some. He was given 4 units of pRBC with good response in Hg. Completed his prep and underwent endoscopy/colonoscopy 1/28 showing gastritis ( biopsies taken), clean based duodenal ulcer in bulb ( Likely source of anemia), no signs of lower GIB. Required brief MICU stay following procedure for observation and extubation (i/s/o c/f potential difficult extubation due to severe MS and baclofen pump pre-procedure), s/p extubation, then transferred back to [facility], with no issues overnight, no new melena and hb stable. Ready for RMF step-down, w/ diet advanced to mechan. soft.     OVERNIGHT EVENTS: NAEO    SUBJECTIVE / INTERVAL HPI: Patient seen and examined at bedside. This AM, felt ok with no complaints.     VITAL SIGNS:  Vital Signs Last 24 Hrs  T(C): 37.1 (29 Jan 2021 05:50), Max: 37.1 (29 Jan 2021 05:50)  T(F): 98.8 (29 Jan 2021 05:50), Max: 98.8 (29 Jan 2021 05:50)  HR: 68 (29 Jan 2021 08:44) (56 - 87)  BP: 156/81 (29 Jan 2021 08:44) (116/57 - 187/95)  BP(mean): 112 (29 Jan 2021 08:44) (81 - 134)  RR: 16 (29 Jan 2021 08:44) (13 - 36)  SpO2: 99% (29 Jan 2021 08:44) (97% - 100%)    PHYSICAL EXAM:    General: WDWN  HEENT: NC/AT; PERRL, anicteric sclera; MMM  Neck: supple  Cardiovascular: +S1/S2; RRR  Respiratory: CTA B/L; no W/R/R  Gastrointestinal: soft, NT/ND; +BSx4  Extremities: WWP; no edema, clubbing or cyanosis  Vascular: 2+ radial, DP/PT pulses B/L  Neurological: AAOx3; no focal deficits    MEDICATIONS:  MEDICATIONS  (STANDING):  artificial  tears Solution 1 Drop(s) Both EYES four times a day  ascorbic acid 500 milliGRAM(s) Oral daily  atorvastatin 20 milliGRAM(s) Oral at bedtime  baclofen PF IntraThecal 120 MICROGram(s) IntraThecal every 24 hours  cholecalciferol 1000 Unit(s) Oral daily  ferrous    sulfate 325 milliGRAM(s) Oral daily  metoprolol tartrate 50 milliGRAM(s) Oral two times a day  morphine ER Tablet 15 milliGRAM(s) Oral every 8 hours  multivitamin/minerals 1 Tablet(s) Oral daily  oxybutynin 5 milliGRAM(s) Oral every 12 hours  pantoprazole    Tablet 40 milliGRAM(s) Oral every 12 hours  pregabalin 50 milliGRAM(s) Oral every 8 hours  senna 2 Tablet(s) Oral at bedtime  sodium chloride 0.45%. 1000 milliLiter(s) (80 mL/Hr) IV Continuous <Continuous>  sodium chloride 0.65% Nasal 1 Spray(s) Both Nostrils two times a day  zinc sulfate 220 milliGRAM(s) Oral daily    MEDICATIONS  (PRN):  acetaminophen   Tablet .. 650 milliGRAM(s) Oral every 6 hours PRN Moderate Pain (4 - 6)    ALLERGIES:  Allergies    No Known Allergies    Intolerances    LABS:                        7.5    8.92  )-----------( 166      ( 29 Jan 2021 09:45 )             24.3     01-29    145  |  110<H>  |  8   ----------------------------<  66<L>  3.8   |  21<L>  |  0.69    Ca    8.0<L>      29 Jan 2021 08:11  Phos  3.1     01-29  Mg     1.6     01-29    TPro  5.5<L>  /  Alb  2.6<L>  /  TBili  0.4  /  DBili  x   /  AST  18  /  ALT  11  /  AlkPhos  62  01-29    PT/INR - ( 29 Jan 2021 08:11 )   PT: 13.4 sec;   INR: 1.12          PTT - ( 29 Jan 2021 08:11 )  PTT:38.3 sec    CAPILLARY BLOOD GLUCOSE      POCT Blood Glucose.: 102 mg/dL (27 Jan 2021 17:30)      RADIOLOGY & ADDITIONAL TESTS: Reviewed.   Hospital Course:  55 year old male with MS, quadriplegia, PERI, htn, hld, bipolar disorder, SICCA, polyneuropathy, pressure ulcers, and chronic pain who presented to [facility] from his nursing home for a Hg of 3.6. Patient denied any melanotic bowel movements or NSAID use; however, nursing home reportedly appreciated some. He was given 4 units of pRBC with good response in Hg. Completed his prep and underwent endoscopy/colonoscopy 1/28 showing gastritis ( biopsies taken), clean based duodenal ulcer in bulb ( Likely source of anemia), no signs of lower GIB. Required brief MICU stay following procedure for observation and extubation (i/s/o c/f potential difficult extubation due to severe MS and baclofen pump pre-procedure), s/p extubation and monitoring, then transferred back to [facility], with no issues overnight, no new melena and hb stable. Ready for RMF step-down, w/ diet advanced to mechan. soft.     OVERNIGHT EVENTS: NAEO    SUBJECTIVE / INTERVAL HPI: Patient seen and examined at bedside. This AM, felt ok with no complaints. Didn't have fevers/chills/trouble breathing/chest pain/abd pain/n/changes in bowel/bladder habits (said that he takes some time to urinate but is ultimately able to go himself, without need for SC).     VITAL SIGNS:  Vital Signs Last 24 Hrs  T(C): 37.1 (29 Jan 2021 05:50), Max: 37.1 (29 Jan 2021 05:50)  T(F): 98.8 (29 Jan 2021 05:50), Max: 98.8 (29 Jan 2021 05:50)  HR: 68 (29 Jan 2021 08:44) (56 - 87)  BP: 156/81 (29 Jan 2021 08:44) (116/57 - 187/95)  BP(mean): 112 (29 Jan 2021 08:44) (81 - 134)  RR: 16 (29 Jan 2021 08:44) (13 - 36)  SpO2: 99% (29 Jan 2021 08:44) (97% - 100%)    PHYSICAL EXAM:  General: laying supine, NAD. Warm heating blanket underneath, on specific bed for avoiding pressure ulcers.   HEENT: NC/AT; PERRL, anicteric sclera; MMM, interestingly no conjunctival pallor  Cardiovascular: +S1/S2; RRR  Respiratory: Inspiratory effort/excursions not great, but no crackles/wheezes, rhonchi heard.   Gastrointestinal: soft, NT/ND; +BSx4 n/l, surgical scar present baclofen pump palpable in Rside of abd adjacent to scar  Extremities: R upper extremity contracted, fingers flexed bilaterally, extremities cool but not cold; 1+ pitting edema b/l up to knee.   Vascular: 2+ radial, DP/PT pulses B/L  Neurological: AAOx3, speech is slow but clear enough to understand when leaning in; functionally quadriplegic; Pt not participating in strength assessment as he said that he wouldn't be able to do it if he tried. Sensation intact b/l, equal.     MEDICATIONS:  MEDICATIONS  (STANDING):  artificial  tears Solution 1 Drop(s) Both EYES four times a day  ascorbic acid 500 milliGRAM(s) Oral daily  atorvastatin 20 milliGRAM(s) Oral at bedtime  baclofen PF IntraThecal 120 MICROGram(s) IntraThecal every 24 hours  cholecalciferol 1000 Unit(s) Oral daily  ferrous    sulfate 325 milliGRAM(s) Oral daily  metoprolol tartrate 50 milliGRAM(s) Oral two times a day  morphine ER Tablet 15 milliGRAM(s) Oral every 8 hours  multivitamin/minerals 1 Tablet(s) Oral daily  oxybutynin 5 milliGRAM(s) Oral every 12 hours  pantoprazole    Tablet 40 milliGRAM(s) Oral every 12 hours  pregabalin 50 milliGRAM(s) Oral every 8 hours  senna 2 Tablet(s) Oral at bedtime  sodium chloride 0.45%. 1000 milliLiter(s) (80 mL/Hr) IV Continuous <Continuous>  sodium chloride 0.65% Nasal 1 Spray(s) Both Nostrils two times a day  zinc sulfate 220 milliGRAM(s) Oral daily    MEDICATIONS  (PRN):  acetaminophen   Tablet .. 650 milliGRAM(s) Oral every 6 hours PRN Moderate Pain (4 - 6)    ALLERGIES:  Allergies    No Known Allergies    Intolerances    LABS:                        7.5    8.92  )-----------( 166      ( 29 Jan 2021 09:45 )             24.3     01-29    145  |  110<H>  |  8   ----------------------------<  66<L>  3.8   |  21<L>  |  0.69    Ca    8.0<L>      29 Jan 2021 08:11  Phos  3.1     01-29  Mg     1.6     01-29    TPro  5.5<L>  /  Alb  2.6<L>  /  TBili  0.4  /  DBili  x   /  AST  18  /  ALT  11  /  AlkPhos  62  01-29    PT/INR - ( 29 Jan 2021 08:11 )   PT: 13.4 sec;   INR: 1.12       PTT - ( 29 Jan 2021 08:11 )  PTT:38.3 sec    CAPILLARY BLOOD GLUCOSE    POCT Blood Glucose.: 102 mg/dL (27 Jan 2021 17:30)    RADIOLOGY & ADDITIONAL TESTS: Reviewed.   Hospital Course:  55 year old male with MS, quadriplegia, PERI, htn, hld, bipolar disorder, SICCA, polyneuropathy, pressure ulcers, and chronic pain who presented to [facility] from his nursing home for a Hg of 3.6. Patient denied any melanotic bowel movements or NSAID use; however, nursing home reportedly appreciated some. He was given 4 units of pRBC with good response in Hg. Completed his prep and underwent endoscopy/colonoscopy 1/28 showing gastritis ( biopsies taken), clean based duodenal ulcer in bulb ( Likely source of anemia), no signs of lower GIB. Required brief MICU stay following procedure for observation and extubation (i/s/o c/f potential difficult extubation due to severe MS and baclofen pump pre-procedure), s/p extubation and monitoring, then transferred back to [facility], with no issues overnight, no new melena and hb stable. Ready for RMF step-down, w/ diet advanced to mechan. soft. Awaiting placement at his nursing home.     OVERNIGHT EVENTS: NAEO    SUBJECTIVE / INTERVAL HPI: Patient seen and examined at bedside. This AM, felt ok with no complaints. Didn't have fevers/chills/trouble breathing/chest pain/abd pain/n/changes in bowel/bladder habits (said that he takes some time to urinate but is ultimately able to go himself, without need for SC).     VITAL SIGNS:  Vital Signs Last 24 Hrs  T(C): 37.1 (29 Jan 2021 05:50), Max: 37.1 (29 Jan 2021 05:50)  T(F): 98.8 (29 Jan 2021 05:50), Max: 98.8 (29 Jan 2021 05:50)  HR: 68 (29 Jan 2021 08:44) (56 - 87)  BP: 156/81 (29 Jan 2021 08:44) (116/57 - 187/95)  BP(mean): 112 (29 Jan 2021 08:44) (81 - 134)  RR: 16 (29 Jan 2021 08:44) (13 - 36)  SpO2: 99% (29 Jan 2021 08:44) (97% - 100%)    PHYSICAL EXAM:  General: laying supine, NAD. Warm heating blanket underneath, on specific bed for avoiding pressure ulcers.   HEENT: NC/AT; PERRL, anicteric sclera; MMM, interestingly no conjunctival pallor  Cardiovascular: +S1/S2; RRR  Respiratory: Inspiratory effort/excursions not great, but no crackles/wheezes, rhonchi heard.   Gastrointestinal: soft, NT/ND; +BSx4 n/l, surgical scar present baclofen pump palpable in Rside of abd adjacent to scar  Extremities: R upper extremity contracted, fingers flexed bilaterally, extremities cool but not cold; 1+ pitting edema b/l up to knee.   Vascular: 2+ radial, DP/PT pulses B/L  Neurological: AAOx3, speech is slow but clear enough to understand when leaning in; functionally quadriplegic; Pt not participating in strength assessment as he said that he wouldn't be able to do it if he tried. Sensation intact b/l, equal.     MEDICATIONS:  MEDICATIONS  (STANDING):  artificial  tears Solution 1 Drop(s) Both EYES four times a day  ascorbic acid 500 milliGRAM(s) Oral daily  atorvastatin 20 milliGRAM(s) Oral at bedtime  baclofen PF IntraThecal 120 MICROGram(s) IntraThecal every 24 hours  cholecalciferol 1000 Unit(s) Oral daily  ferrous    sulfate 325 milliGRAM(s) Oral daily  metoprolol tartrate 50 milliGRAM(s) Oral two times a day  morphine ER Tablet 15 milliGRAM(s) Oral every 8 hours  multivitamin/minerals 1 Tablet(s) Oral daily  oxybutynin 5 milliGRAM(s) Oral every 12 hours  pantoprazole    Tablet 40 milliGRAM(s) Oral every 12 hours  pregabalin 50 milliGRAM(s) Oral every 8 hours  senna 2 Tablet(s) Oral at bedtime  sodium chloride 0.45%. 1000 milliLiter(s) (80 mL/Hr) IV Continuous <Continuous>  sodium chloride 0.65% Nasal 1 Spray(s) Both Nostrils two times a day  zinc sulfate 220 milliGRAM(s) Oral daily    MEDICATIONS  (PRN):  acetaminophen   Tablet .. 650 milliGRAM(s) Oral every 6 hours PRN Moderate Pain (4 - 6)    ALLERGIES:  Allergies    No Known Allergies    Intolerances    LABS:                        7.5    8.92  )-----------( 166      ( 29 Jan 2021 09:45 )             24.3     01-29    145  |  110<H>  |  8   ----------------------------<  66<L>  3.8   |  21<L>  |  0.69    Ca    8.0<L>      29 Jan 2021 08:11  Phos  3.1     01-29  Mg     1.6     01-29    TPro  5.5<L>  /  Alb  2.6<L>  /  TBili  0.4  /  DBili  x   /  AST  18  /  ALT  11  /  AlkPhos  62  01-29    PT/INR - ( 29 Jan 2021 08:11 )   PT: 13.4 sec;   INR: 1.12       PTT - ( 29 Jan 2021 08:11 )  PTT:38.3 sec    CAPILLARY BLOOD GLUCOSE    POCT Blood Glucose.: 102 mg/dL (27 Jan 2021 17:30)    RADIOLOGY & ADDITIONAL TESTS: Reviewed.

## 2021-01-29 NOTE — PROGRESS NOTE ADULT - PROBLEM SELECTOR PLAN 1
IMPROVING  Patient with hx of PERI, poor historian but reports history of "low blood levels" Presented w/ 3.5 and FOBT positive in ED.   -S/P 4u prbc on adm, with improvement in hb to 8.0. 1/28 AM hb 8.1  - B12 1339, Folate > 20  - Iron studies showing PERI  - Transfuse Hb >7  - GI workup as detailed below. IMPROVING  Patient with hx of PERI, poor historian but reports history of "low blood levels" Presented w/ 3.5 and FOBT positive in ED.   -S/P 4u prbc on adm, with improvement in hb to 8.0. 1/28 AM hb 8.1; 1/29 7.1, repeat 7.5  - B12 1339, Folate > 20  - Iron studies showing PERI; pt on ferrous sulfate at NH, which is continued here  - Transfuse Hb >7  - GI workup as detailed below.

## 2021-01-29 NOTE — PROGRESS NOTE ADULT - PROBLEM SELECTOR PLAN 1
IMPROVING  Patient with hx of PERI, poor historian but reports history of "low blood levels" Presented w/ 3.5 and FOBT positive in ED.   -S/P 4u prbc on adm, with improvement in hb to 8.0. 1/28 AM hb 8.1; 1/29 7.1, repeat 7.5  - B12 1339, Folate > 20  - Iron studies showing PERI; pt on ferrous sulfate at NH, which is continued here  - Transfuse Hb >7  - GI workup as detailed below.

## 2021-01-29 NOTE — PROGRESS NOTE ADULT - PROBLEM SELECTOR PLAN 5
- per nursing home records, pt on metoprolol tartrate 100 mg BID  - restarted s/p EGD    #HLD  - c/w atorvastatin 20 mg QHS 61

## 2021-01-29 NOTE — ADVANCED PRACTICE NURSE CONSULT - RECOMMEDATIONS
Sacral pressure injury - cleanse with normal saline, fill wound loosely with Aquacel Extra, cover with Allevyn foam dressing every other day.   Continue to turn and reposition the patient every 2 hours and offload heels.  WOCN discussed assessment and recommendations with MAAME Valenzuela and house staff Dr Mendes.

## 2021-01-29 NOTE — PROGRESS NOTE ADULT - PROBLEM SELECTOR PLAN 2
RESOLVED  Endoscopy 1/28 showed clean based duodenal bulb ulcer which may be source of UGIB  - Prep not sufficient for colonoscopy, no mass seen - will need repeat outpatient likely  - c/w PPI 40mg PO BID  - maintain active type and screen  - maintain IV access

## 2021-01-30 LAB
ALBUMIN SERPL ELPH-MCNC: 3 G/DL — LOW (ref 3.3–5)
ALP SERPL-CCNC: 76 U/L — SIGNIFICANT CHANGE UP (ref 40–120)
ALT FLD-CCNC: 15 U/L — SIGNIFICANT CHANGE UP (ref 10–45)
ANION GAP SERPL CALC-SCNC: 12 MMOL/L — SIGNIFICANT CHANGE UP (ref 5–17)
AST SERPL-CCNC: 26 U/L — SIGNIFICANT CHANGE UP (ref 10–40)
BASOPHILS # BLD AUTO: 0.01 K/UL — SIGNIFICANT CHANGE UP (ref 0–0.2)
BASOPHILS NFR BLD AUTO: 0.2 % — SIGNIFICANT CHANGE UP (ref 0–2)
BILIRUB SERPL-MCNC: 0.3 MG/DL — SIGNIFICANT CHANGE UP (ref 0.2–1.2)
BUN SERPL-MCNC: 5 MG/DL — LOW (ref 7–23)
CALCIUM SERPL-MCNC: 8.3 MG/DL — LOW (ref 8.4–10.5)
CHLORIDE SERPL-SCNC: 106 MMOL/L — SIGNIFICANT CHANGE UP (ref 96–108)
CO2 SERPL-SCNC: 24 MMOL/L — SIGNIFICANT CHANGE UP (ref 22–31)
CREAT SERPL-MCNC: 0.69 MG/DL — SIGNIFICANT CHANGE UP (ref 0.5–1.3)
EOSINOPHIL # BLD AUTO: 0.16 K/UL — SIGNIFICANT CHANGE UP (ref 0–0.5)
EOSINOPHIL NFR BLD AUTO: 2.7 % — SIGNIFICANT CHANGE UP (ref 0–6)
GLUCOSE SERPL-MCNC: 88 MG/DL — SIGNIFICANT CHANGE UP (ref 70–99)
HCT VFR BLD CALC: 32.4 % — LOW (ref 39–50)
HGB BLD-MCNC: 9.7 G/DL — LOW (ref 13–17)
IMM GRANULOCYTES NFR BLD AUTO: 0.5 % — SIGNIFICANT CHANGE UP (ref 0–1.5)
LYMPHOCYTES # BLD AUTO: 1.3 K/UL — SIGNIFICANT CHANGE UP (ref 1–3.3)
LYMPHOCYTES # BLD AUTO: 21.8 % — SIGNIFICANT CHANGE UP (ref 13–44)
MAGNESIUM SERPL-MCNC: 1.7 MG/DL — SIGNIFICANT CHANGE UP (ref 1.6–2.6)
MCHC RBC-ENTMCNC: 29.2 PG — SIGNIFICANT CHANGE UP (ref 27–34)
MCHC RBC-ENTMCNC: 29.9 GM/DL — LOW (ref 32–36)
MCV RBC AUTO: 97.6 FL — SIGNIFICANT CHANGE UP (ref 80–100)
MONOCYTES # BLD AUTO: 0.57 K/UL — SIGNIFICANT CHANGE UP (ref 0–0.9)
MONOCYTES NFR BLD AUTO: 9.5 % — SIGNIFICANT CHANGE UP (ref 2–14)
NEUTROPHILS # BLD AUTO: 3.9 K/UL — SIGNIFICANT CHANGE UP (ref 1.8–7.4)
NEUTROPHILS NFR BLD AUTO: 65.3 % — SIGNIFICANT CHANGE UP (ref 43–77)
NRBC # BLD: 0 /100 WBCS — SIGNIFICANT CHANGE UP (ref 0–0)
PHOSPHATE SERPL-MCNC: 3.1 MG/DL — SIGNIFICANT CHANGE UP (ref 2.5–4.5)
PLATELET # BLD AUTO: 200 K/UL — SIGNIFICANT CHANGE UP (ref 150–400)
POTASSIUM SERPL-MCNC: 4.1 MMOL/L — SIGNIFICANT CHANGE UP (ref 3.5–5.3)
POTASSIUM SERPL-SCNC: 4.1 MMOL/L — SIGNIFICANT CHANGE UP (ref 3.5–5.3)
PROT SERPL-MCNC: 6.6 G/DL — SIGNIFICANT CHANGE UP (ref 6–8.3)
RBC # BLD: 3.32 M/UL — LOW (ref 4.2–5.8)
RBC # FLD: 18.6 % — HIGH (ref 10.3–14.5)
SODIUM SERPL-SCNC: 142 MMOL/L — SIGNIFICANT CHANGE UP (ref 135–145)
WBC # BLD: 5.97 K/UL — SIGNIFICANT CHANGE UP (ref 3.8–10.5)
WBC # FLD AUTO: 5.97 K/UL — SIGNIFICANT CHANGE UP (ref 3.8–10.5)

## 2021-01-30 PROCEDURE — 99233 SBSQ HOSP IP/OBS HIGH 50: CPT

## 2021-01-30 RX ORDER — MAGNESIUM SULFATE 500 MG/ML
2 VIAL (ML) INJECTION ONCE
Refills: 0 | Status: COMPLETED | OUTPATIENT
Start: 2021-01-30 | End: 2021-01-30

## 2021-01-30 RX ADMIN — Medication 100 MILLIGRAM(S): at 17:22

## 2021-01-30 RX ADMIN — MORPHINE SULFATE 15 MILLIGRAM(S): 50 CAPSULE, EXTENDED RELEASE ORAL at 06:13

## 2021-01-30 RX ADMIN — Medication 1 DROP(S): at 06:13

## 2021-01-30 RX ADMIN — Medication 1000 UNIT(S): at 11:31

## 2021-01-30 RX ADMIN — Medication 50 MILLIGRAM(S): at 01:14

## 2021-01-30 RX ADMIN — Medication 50 MILLIGRAM(S): at 09:12

## 2021-01-30 RX ADMIN — Medication 1 SPRAY(S): at 17:16

## 2021-01-30 RX ADMIN — Medication 1 SPRAY(S): at 06:13

## 2021-01-30 RX ADMIN — Medication 325 MILLIGRAM(S): at 11:31

## 2021-01-30 RX ADMIN — Medication 1 DROP(S): at 11:31

## 2021-01-30 RX ADMIN — Medication 5 MILLIGRAM(S): at 06:14

## 2021-01-30 RX ADMIN — ATORVASTATIN CALCIUM 20 MILLIGRAM(S): 80 TABLET, FILM COATED ORAL at 21:36

## 2021-01-30 RX ADMIN — Medication 1 DROP(S): at 17:16

## 2021-01-30 RX ADMIN — ZINC SULFATE TAB 220 MG (50 MG ZINC EQUIVALENT) 220 MILLIGRAM(S): 220 (50 ZN) TAB at 11:31

## 2021-01-30 RX ADMIN — Medication 50 GRAM(S): at 19:35

## 2021-01-30 RX ADMIN — Medication 500 MILLIGRAM(S): at 11:31

## 2021-01-30 RX ADMIN — Medication 1 TABLET(S): at 11:31

## 2021-01-30 RX ADMIN — PANTOPRAZOLE SODIUM 40 MILLIGRAM(S): 20 TABLET, DELAYED RELEASE ORAL at 06:14

## 2021-01-30 RX ADMIN — SENNA PLUS 2 TABLET(S): 8.6 TABLET ORAL at 21:36

## 2021-01-30 RX ADMIN — MORPHINE SULFATE 15 MILLIGRAM(S): 50 CAPSULE, EXTENDED RELEASE ORAL at 14:43

## 2021-01-30 RX ADMIN — Medication 50 MILLIGRAM(S): at 17:15

## 2021-01-30 RX ADMIN — PANTOPRAZOLE SODIUM 40 MILLIGRAM(S): 20 TABLET, DELAYED RELEASE ORAL at 17:16

## 2021-01-30 RX ADMIN — MORPHINE SULFATE 15 MILLIGRAM(S): 50 CAPSULE, EXTENDED RELEASE ORAL at 21:36

## 2021-01-30 RX ADMIN — Medication 5 MILLIGRAM(S): at 17:16

## 2021-01-30 RX ADMIN — Medication 100 MILLIGRAM(S): at 06:13

## 2021-01-30 NOTE — PROGRESS NOTE ADULT - PROBLEM SELECTOR PLAN 3
AFEBRILE- CONTINUING TO MONITOR  Pt reports fever at nursing home, however afebrile in ED (Tmax of 100.2). States that he was started on antibiotics for unknown origin, was pancultured at NH. CXR demonstrates R hilar ?infiltrate. Patient without cough  - per nursing home records, pt was started on vanc 750 q12h and zosyn 3.375 g q8h for fever. Unclear source.  -Unclear why pt was put on those abx, and attempts for collateral from NH  unsuccessful; spoke to pt's HCP who mentioned that pt was being "treated for a UTI"; will trial of abx and assessment pt's vitals and white count and follow up the obtained cultures mentioned below. Has not been febrile while in hospital.   - Blood cx NGTD   - Ucx 2000 CFU psudomonas, no need for abx for now  - Procalcitonin 0.50

## 2021-01-30 NOTE — PROGRESS NOTE ADULT - SUBJECTIVE AND OBJECTIVE BOX
O/N Events:NA  Subjective/ROS: Patient stating he was doing well and had no complaints. Denies HA, CP, SOB, n/v, changes in bowel/urinary habits.  12pt ROS otherwise negative.    VITALS  Vital Signs Last 24 Hrs  T(C): 37.1 (30 Jan 2021 17:21), Max: 37.1 (30 Jan 2021 17:21)  T(F): 98.7 (30 Jan 2021 17:21), Max: 98.7 (30 Jan 2021 17:21)  HR: 68 (30 Jan 2021 17:21) (66 - 79)  BP: 170/91 (30 Jan 2021 17:21) (167/86 - 195/89)  BP(mean): --  RR: 17 (30 Jan 2021 17:21) (17 - 18)  SpO2: 99% (30 Jan 2021 17:21) (98% - 99%)    CAPILLARY BLOOD GLUCOSE    PHYSICAL EXAM  General: A&Ox3; NAD  Head: NC/AT; MMM; PERRL; EOMI;  Upper Extremity: Folded inward and rigid   Lower Extremity: Could not raise legs bilaterally    Neck: Supple; no JVD  Respiratory: CTA B/L; no wheezes/crackles   Cardiovascular: Regular rhythm/rate; S1/S2   Gastrointestinal: Soft; NTND; normoactive BS  Extremities: WWP; 1+ pitting edema   Neurological:  CNII-XII grossly intact; no obvious focal deficits    MEDICATIONS  (STANDING):  artificial  tears Solution 1 Drop(s) Both EYES four times a day  ascorbic acid 500 milliGRAM(s) Oral daily  atorvastatin 20 milliGRAM(s) Oral at bedtime  baclofen PF IntraThecal 120 MICROGram(s) IntraThecal every 24 hours  cholecalciferol 1000 Unit(s) Oral daily  ferrous    sulfate 325 milliGRAM(s) Oral daily  magnesium sulfate  IVPB 2 Gram(s) IV Intermittent once  metoprolol tartrate 100 milliGRAM(s) Oral every 12 hours  morphine ER Tablet 15 milliGRAM(s) Oral every 8 hours  multivitamin/minerals 1 Tablet(s) Oral daily  oxybutynin 5 milliGRAM(s) Oral every 12 hours  pantoprazole    Tablet 40 milliGRAM(s) Oral every 12 hours  pregabalin 50 milliGRAM(s) Oral every 8 hours  senna 2 Tablet(s) Oral at bedtime  sodium chloride 0.45%. 1000 milliLiter(s) (80 mL/Hr) IV Continuous <Continuous>  sodium chloride 0.65% Nasal 1 Spray(s) Both Nostrils two times a day  zinc sulfate 220 milliGRAM(s) Oral daily    MEDICATIONS  (PRN):  acetaminophen   Tablet .. 650 milliGRAM(s) Oral every 6 hours PRN Moderate Pain (4 - 6)      No Known Allergies      LABS                        9.7    5.97  )-----------( 200      ( 30 Jan 2021 07:06 )             32.4     01-30    142  |  106  |  5<L>  ----------------------------<  88  4.1   |  24  |  0.69    Ca    8.3<L>      30 Jan 2021 07:07  Phos  3.1     01-30  Mg     1.7     01-30    TPro  6.6  /  Alb  3.0<L>  /  TBili  0.3  /  DBili  x   /  AST  26  /  ALT  15  /  AlkPhos  76  01-30    PT/INR - ( 29 Jan 2021 08:11 )   PT: 13.4 sec;   INR: 1.12          PTT - ( 29 Jan 2021 08:11 )  PTT:38.3 sec         O/N Events: KALANI  Subjective/ROS: Patient stating he was doing well and had no complaints. Denies HA, CP, SOB, n/v, changes in bowel/urinary habits.  12pt ROS otherwise negative.    VITALS  Vital Signs Last 24 Hrs  T(C): 37.1 (30 Jan 2021 17:21), Max: 37.1 (30 Jan 2021 17:21)  T(F): 98.7 (30 Jan 2021 17:21), Max: 98.7 (30 Jan 2021 17:21)  HR: 68 (30 Jan 2021 17:21) (66 - 79)  BP: 170/91 (30 Jan 2021 17:21) (167/86 - 195/89)  BP(mean): --  RR: 17 (30 Jan 2021 17:21) (17 - 18)  SpO2: 99% (30 Jan 2021 17:21) (98% - 99%)    CAPILLARY BLOOD GLUCOSE    PHYSICAL EXAM  General: A&Ox3; NAD  Head: NC/AT; MMM; PERRL; EOMI;  Upper Extremity: Folded inward and rigid   Lower Extremity: Could not raise legs bilaterally    Neck: Supple; no JVD  Respiratory: CTA B/L; no wheezes/crackles   Cardiovascular: Regular rhythm/rate; S1/S2   Gastrointestinal: Soft; NTND; normoactive BS  Extremities: WWP; 1+ pitting edema   Neurological:  CNII-XII grossly intact; no obvious focal deficits    MEDICATIONS  (STANDING):  artificial  tears Solution 1 Drop(s) Both EYES four times a day  ascorbic acid 500 milliGRAM(s) Oral daily  atorvastatin 20 milliGRAM(s) Oral at bedtime  baclofen PF IntraThecal 120 MICROGram(s) IntraThecal every 24 hours  cholecalciferol 1000 Unit(s) Oral daily  ferrous    sulfate 325 milliGRAM(s) Oral daily  magnesium sulfate  IVPB 2 Gram(s) IV Intermittent once  metoprolol tartrate 100 milliGRAM(s) Oral every 12 hours  morphine ER Tablet 15 milliGRAM(s) Oral every 8 hours  multivitamin/minerals 1 Tablet(s) Oral daily  oxybutynin 5 milliGRAM(s) Oral every 12 hours  pantoprazole    Tablet 40 milliGRAM(s) Oral every 12 hours  pregabalin 50 milliGRAM(s) Oral every 8 hours  senna 2 Tablet(s) Oral at bedtime  sodium chloride 0.45%. 1000 milliLiter(s) (80 mL/Hr) IV Continuous <Continuous>  sodium chloride 0.65% Nasal 1 Spray(s) Both Nostrils two times a day  zinc sulfate 220 milliGRAM(s) Oral daily    MEDICATIONS  (PRN):  acetaminophen   Tablet .. 650 milliGRAM(s) Oral every 6 hours PRN Moderate Pain (4 - 6)      No Known Allergies      LABS                        9.7    5.97  )-----------( 200      ( 30 Jan 2021 07:06 )             32.4     01-30    142  |  106  |  5<L>  ----------------------------<  88  4.1   |  24  |  0.69    Ca    8.3<L>      30 Jan 2021 07:07  Phos  3.1     01-30  Mg     1.7     01-30    TPro  6.6  /  Alb  3.0<L>  /  TBili  0.3  /  DBili  x   /  AST  26  /  ALT  15  /  AlkPhos  76  01-30    PT/INR - ( 29 Jan 2021 08:11 )   PT: 13.4 sec;   INR: 1.12          PTT - ( 29 Jan 2021 08:11 )  PTT:38.3 sec

## 2021-01-30 NOTE — PROGRESS NOTE ADULT - ASSESSMENT
Pt is a 56 y/o male, poor historian w/ pmhx of MS, quadriplegia, iron deficiency anemia, HTN, HLD, bipolar disorder, sicca syndrome, polyneuropathy, chronic pain, pressure ulcer, mrsa infection, presenting from nursing home with Hb of 3.6. Pt admitted for severe anemia 2/2 presumed GI bleed. S/p EGD 1/28 that showed gastritis with clean based duodenal bulb ulcer.

## 2021-01-31 ENCOUNTER — TRANSCRIPTION ENCOUNTER (OUTPATIENT)
Age: 56
End: 2021-01-31

## 2021-01-31 PROCEDURE — 99233 SBSQ HOSP IP/OBS HIGH 50: CPT

## 2021-01-31 RX ORDER — AMLODIPINE BESYLATE 2.5 MG/1
5 TABLET ORAL DAILY
Refills: 0 | Status: DISCONTINUED | OUTPATIENT
Start: 2021-01-31 | End: 2021-02-01

## 2021-01-31 RX ORDER — POLYETHYLENE GLYCOL 3350 17 G/17G
17 POWDER, FOR SOLUTION ORAL DAILY
Refills: 0 | Status: DISCONTINUED | OUTPATIENT
Start: 2021-01-31 | End: 2021-02-02

## 2021-01-31 RX ADMIN — Medication 1 DROP(S): at 22:30

## 2021-01-31 RX ADMIN — Medication 500 MILLIGRAM(S): at 11:06

## 2021-01-31 RX ADMIN — MORPHINE SULFATE 15 MILLIGRAM(S): 50 CAPSULE, EXTENDED RELEASE ORAL at 14:48

## 2021-01-31 RX ADMIN — Medication 50 MILLIGRAM(S): at 09:22

## 2021-01-31 RX ADMIN — Medication 50 MILLIGRAM(S): at 00:52

## 2021-01-31 RX ADMIN — ZINC SULFATE TAB 220 MG (50 MG ZINC EQUIVALENT) 220 MILLIGRAM(S): 220 (50 ZN) TAB at 11:06

## 2021-01-31 RX ADMIN — Medication 1 SPRAY(S): at 17:20

## 2021-01-31 RX ADMIN — Medication 1 DROP(S): at 17:20

## 2021-01-31 RX ADMIN — Medication 1 TABLET(S): at 11:06

## 2021-01-31 RX ADMIN — AMLODIPINE BESYLATE 5 MILLIGRAM(S): 2.5 TABLET ORAL at 11:06

## 2021-01-31 RX ADMIN — PANTOPRAZOLE SODIUM 40 MILLIGRAM(S): 20 TABLET, DELAYED RELEASE ORAL at 05:50

## 2021-01-31 RX ADMIN — Medication 325 MILLIGRAM(S): at 11:06

## 2021-01-31 RX ADMIN — Medication 1000 UNIT(S): at 11:06

## 2021-01-31 RX ADMIN — MORPHINE SULFATE 15 MILLIGRAM(S): 50 CAPSULE, EXTENDED RELEASE ORAL at 05:49

## 2021-01-31 RX ADMIN — POLYETHYLENE GLYCOL 3350 17 GRAM(S): 17 POWDER, FOR SOLUTION ORAL at 17:02

## 2021-01-31 RX ADMIN — Medication 5 MILLIGRAM(S): at 05:50

## 2021-01-31 RX ADMIN — Medication 5 MILLIGRAM(S): at 17:21

## 2021-01-31 RX ADMIN — Medication 1 DROP(S): at 11:06

## 2021-01-31 RX ADMIN — MORPHINE SULFATE 15 MILLIGRAM(S): 50 CAPSULE, EXTENDED RELEASE ORAL at 22:29

## 2021-01-31 RX ADMIN — Medication 100 MILLIGRAM(S): at 17:20

## 2021-01-31 RX ADMIN — Medication 100 MILLIGRAM(S): at 05:49

## 2021-01-31 RX ADMIN — Medication 50 MILLIGRAM(S): at 17:02

## 2021-01-31 RX ADMIN — SENNA PLUS 2 TABLET(S): 8.6 TABLET ORAL at 22:30

## 2021-01-31 RX ADMIN — PANTOPRAZOLE SODIUM 40 MILLIGRAM(S): 20 TABLET, DELAYED RELEASE ORAL at 17:02

## 2021-01-31 RX ADMIN — ATORVASTATIN CALCIUM 20 MILLIGRAM(S): 80 TABLET, FILM COATED ORAL at 22:29

## 2021-01-31 RX ADMIN — Medication 1 DROP(S): at 05:50

## 2021-01-31 NOTE — PROGRESS NOTE ADULT - SUBJECTIVE AND OBJECTIVE BOX
Patient is a 55y old  Male who presents with a chief complaint of GI Bleed (29 Jan 2021 06:42)      SUBJECTIVE / OVERNIGHT EVENTS:  No acute events overnight.  Patient with hypertension overnight.       MEDICATIONS  (STANDING):  amLODIPine   Tablet 5 milliGRAM(s) Oral daily  artificial  tears Solution 1 Drop(s) Both EYES four times a day  ascorbic acid 500 milliGRAM(s) Oral daily  atorvastatin 20 milliGRAM(s) Oral at bedtime  baclofen PF IntraThecal 120 MICROGram(s) IntraThecal every 24 hours  cholecalciferol 1000 Unit(s) Oral daily  ferrous    sulfate 325 milliGRAM(s) Oral daily  metoprolol tartrate 100 milliGRAM(s) Oral every 12 hours  morphine ER Tablet 15 milliGRAM(s) Oral every 8 hours  multivitamin/minerals 1 Tablet(s) Oral daily  oxybutynin 5 milliGRAM(s) Oral every 12 hours  pantoprazole    Tablet 40 milliGRAM(s) Oral every 12 hours  pregabalin 50 milliGRAM(s) Oral every 8 hours  senna 2 Tablet(s) Oral at bedtime  sodium chloride 0.65% Nasal 1 Spray(s) Both Nostrils two times a day  zinc sulfate 220 milliGRAM(s) Oral daily    MEDICATIONS  (PRN):  acetaminophen   Tablet .. 650 milliGRAM(s) Oral every 6 hours PRN Moderate Pain (4 - 6)      CAPILLARY BLOOD GLUCOSE        I&O's Summary      PHYSICAL EXAM:  Vital Signs Last 24 Hrs  T(C): 36.4 (31 Jan 2021 06:23), Max: 37.1 (30 Jan 2021 17:21)  T(F): 97.5 (31 Jan 2021 06:23), Max: 98.7 (30 Jan 2021 17:21)  HR: 72 (31 Jan 2021 06:23) (67 - 79)  BP: 166/91 (31 Jan 2021 07:09) (163/91 - 199/97)  BP(mean): --  RR: 18 (31 Jan 2021 06:23) (17 - 18)  SpO2: 100% (31 Jan 2021 06:23) (98% - 100%)  GENERAL: NAD, bedbound  HEAD:  Atraumatic, Normocephalic  EYES: EOMI, PERRLA, conjunctiva and sclera clear  NECK: Supple, No JVD  CHEST/LUNG: Clear to auscultation bilaterally; No wheeze  HEART: Regular rate and rhythm; No murmurs, rubs, or gallops  ABDOMEN: Soft, Nontender, Nondistended; Bowel sounds present  EXTREMITIES:  2+ Peripheral Pulses, No clubbing, cyanosis, or edema. Contracted extremities. Right UE especially contracted inward  PSYCH: AAOx3  NEUROLOGY: non-focal  SKIN: No rashes or lesions    LABS:                        9.7    5.97  )-----------( 200      ( 30 Jan 2021 07:06 )             32.4     01-30    142  |  106  |  5<L>  ----------------------------<  88  4.1   |  24  |  0.69    Ca    8.3<L>      30 Jan 2021 07:07  Phos  3.1     01-30  Mg     1.7     01-30    TPro  6.6  /  Alb  3.0<L>  /  TBili  0.3  /  DBili  x   /  AST  26  /  ALT  15  /  AlkPhos  76  01-30              RADIOLOGY & ADDITIONAL TESTS:    Imaging Personally Reviewed:    Consultant(s) Notes Reviewed:      Care Discussed with Consultants/Other Providers:

## 2021-01-31 NOTE — PROGRESS NOTE ADULT - PROBLEM SELECTOR PLAN 2
RESOLVED  - Endoscopy 1/28 showed clean based duodenal bulb ulcer which may be source   of UGIB  - Prep not sufficient for colonoscopy, no mass seen - will need repeat outpatient   likely  - c/w PPI 40mg PO BID  - Maintain active type and screen  - Maintain IV access

## 2021-01-31 NOTE — PROGRESS NOTE ADULT - PROBLEM SELECTOR PLAN 3
AFEBRILE- CONTINUING TO MONITOR  - Pt reports fever at nursing home, however afebrile in ED (Tmax of 100.2). States   that he was started on antibiotics for unknown origin, was pancultured at NH. CXR   demonstrates R hilar ?infiltrate. Patient without cough  - Per nursing home records, pt was started on vanc 750 q12h and zosyn 3.375 g   q8h for fever. Unclear source.  - Unclear why pt was put on those abx, and attempts for collateral from NH    unsuccessful; spoke to pt's HCP who mentioned that pt was being "treated for a   UTI"; will trial of abx and assessment pt's vitals and white count and follow up the   obtained cultures mentioned below. Has not been febrile while in hospital.   - Blood cx NGTD   - Ucx 2000 CFU psudomonas, no need for abx for now  - Procalcitonin 0.50  - LUE PICC line placed at NH to be removed.

## 2021-01-31 NOTE — DISCHARGE NOTE PROVIDER - CARE PROVIDER_API CALL
Cullen Blum (MD)  Gastroenterology; Internal Medicine  [address], [address] [place] [address]  Phone: [phone]  Fax: [phone]  Follow Up Time: 2 weeks

## 2021-01-31 NOTE — DISCHARGE NOTE PROVIDER - NSDCCPCAREPLAN_GEN_ALL_CORE_FT
PRINCIPAL DISCHARGE DIAGNOSIS  Diagnosis: Severe anemia  Assessment and Plan of Treatment: Anemia is the medical term for when a person has too few red blood cells. Red blood cells are the cells in your blood that carry oxygen. If you have too few red blood cells, your body does not get all the oxygen it needs. Most people with anemia have no symptoms. They find out they have it after their doctor does blood tests for another reason. People who do have symptoms might feel tired or weak, especially if they try to exercise or have headaches. If you experience these symptoms you should see your primary care provider for further evaluation.  You were found to have very low blood levels.  You were transfused blood.  A procedure was performed to look into your digestive system.  A clean based ulcer was found, and this was the lkely source of anemia.  You were started on a medication known protonix.  You will continue to take this medication once you are discharged.          PRINCIPAL DISCHARGE DIAGNOSIS  Diagnosis: Severe anemia  Assessment and Plan of Treatment: When the patient arrived to the hospital he was found to have a hemoglobin of 3.6 he denied any excessive NSAID use or hematochezia. The patient was transfused four units of blood.  He then had an EGD performed which showed clean base duodenal ulcers.  After the procedure he was taken to MICU for observation and concern of difficult extubation in the setting of MS.  Anemia is the medical term for when a person has too few red blood cells. Red blood cells are the cells in your blood that carry oxygen. If you have too few red blood cells, your body does not get all the oxygen it needs. Most people with anemia have no symptoms. They find out they have it after their doctor does blood tests for another reason. People who do have symptoms might feel tired or weak, especially if they try to exercise or have headaches. If you experience these symptoms you should see your primary care provider for further evaluation.  You were found to have very low blood levels.  You were transfused blood.  A procedure was performed to look into your digestive system.  A clean based ulcer was found, and this was the lkely source of anemia.  You were started on a medication known protonix.  You will continue to take this medication once you are discharged.         SECONDARY DISCHARGE DIAGNOSES  Diagnosis: Hypoxia  Assessment and Plan of Treatment:     Diagnosis: Chest pain  Assessment and Plan of Treatment:     Diagnosis: Melena  Assessment and Plan of Treatment: Melena     PRINCIPAL DISCHARGE DIAGNOSIS  Diagnosis: Severe anemia  Assessment and Plan of Treatment: When the patient arrived to the hospital he was found to have a hemoglobin of 3.6 he denied any excessive NSAID use or hematochezia. The patient was transfused four units of blood.  He then had an EGD performed which showed clean base duodenal ulcers.  Patient was intubated during the procedure and then transferd to MICU for possibliity of difficult extubation due to severe MS.  The patient was successfuuly exutbated. He was then transitioned to the [facility] where his blood pressure was monitored.  He was started on lisinopril 10mg.  Patient Information:   Anemia is the medical term for when a person has too few red blood cells. Red blood cells are the cells in your blood that carry oxygen. If you have too few red blood cells, your body does not get all the oxygen it needs. Most people with anemia have no symptoms. They find out they have it after their doctor does blood tests for another reason. People who do have symptoms might feel tired or weak, especially if they try to exercise or have headaches. If you experience these symptoms you should see your primary care provider for further evaluation.  You were found to have very low blood levels.  You were transfused blood.  A procedure was performed to look into your digestive system.  A clean based ulcer was found, and this was the lkely source of anemia.  You were started on a medication known protonix.  You will continue to take this medication once you are discharged. You will take one tablet by mouth twice a day.

## 2021-01-31 NOTE — PROVIDER CONTACT NOTE (OTHER) - ACTION/TREATMENT ORDERED:
Give 100mg Metroprolol medications recheck BP in one hour
Patient refused straight cath. Risks discussed with patient by MD. Will rescan in hour

## 2021-01-31 NOTE — PROGRESS NOTE ADULT - PROBLEM SELECTOR PLAN 5
- Per nursing home records, pt on metoprolol tartrate 100 mg BID  - Uncontrolled this admission.   - BP's have been obtained in [facility], which is contracted, likely false readings.   - Staff has been unable to obtain BP in [facility] due to PICC line.   - Once     #HLD  - c/w atorvastatin 20 mg QHS - Per nursing home records, pt on metoprolol tartrate 100 mg BID  - Uncontrolled this admission.   - BP's have been obtained in [facility], which is contracted, likely false readings.   - Staff has been unable to obtain BP in [facility] due to PICC line.   - Once PICC is removed, vitals to be obtained from [facility].  - Norvasc 5mg PO daily added.     #HLD  - c/w atorvastatin 20 mg QHS

## 2021-01-31 NOTE — PROGRESS NOTE ADULT - PROBLEM SELECTOR PLAN 1
RESOLVED  - Patient with hx of PERI, poor historian but reports history of "low blood levels" - Presented w/ 3.5 and FOBT positive in ED.   - S/P 4u prbc on adm, with improvement in hb to 8.0. 1/28 AM hb 8.1; 1/29 7.1,   repeat 7.5  - B12 1339, Folate > 20  - Iron studies showing PERI; pt on ferrous sulfate at NH, which is continued here  - Transfuse Hb >7  - GI workup as detailed below.

## 2021-01-31 NOTE — DISCHARGE NOTE PROVIDER - NSDCFUADDAPPT_GEN_ALL_CORE_FT
Please follow up with your primary care physician Dr. Farias who you follow up with while at [facility] Please follow up with your primary care physician Dr. Farias who you follow up with while at [facility]    Please note that the office of Dr. Cullen Blum will contact you directly to schedule an appointment following discharge from the hospital.  Appointment was facilitated by Ms. NORMA Lindsay, Referral Coordinator.

## 2021-01-31 NOTE — DISCHARGE NOTE PROVIDER - NSDCMRMEDTOKEN_GEN_ALL_CORE_FT
acetaminophen 500 mg oral tablet: 2 tab(s) orally 3 times a day, As Needed  Artificial Tears ophthalmic solution: 1 drop(s) to each affected eye 4 times a day  atorvastatin 20 mg oral tablet: 1 tab(s) orally once a day  cholecalciferol oral tablet: 1000 unit(s) orally once a day  docusate potassium 100 mg oral capsule: 3 cap(s) orally once a day (at bedtime)  Enemeez Mini 283 mg rectal enema: 1 each rectal once a day, As Needed  ferrous sulfate 325 mg (65 mg elemental iron) oral tablet: 1 tab(s) orally once a day  lactulose 10 g/15 mL oral solution: 30 milliliter(s) orally 2 times a day  Metoprolol Tartrate 100 mg oral tablet: 1 tab(s) orally 2 times a day  MS Contin 15 mg oral tablet, extended release: 1 tab(s) orally every 8 hours  Omega-3 1000 mg oral capsule: 1 cap(s) orally once a day  oxybutynin 5 mg oral tablet: 1 tab(s) orally 2 times a day  [id number] oral powder for reconstitution: 17 gram(s) orally 2 times a day  Pfizer-BioNTech COVID-19 Vaccine 225 mcg/0.45 mL preservative-free intramuscular suspension: 1 dose(s) intramuscular once on 1/11/21 and on 2/1/21  pregabalin 50 mg oral capsule: 1 cap(s) orally 3 times a day  Senna 8.6 mg oral tablet: 2 tab(s) orally once a day (at bedtime)  sodium chloride nasal spray: 1 spray(s) nasal 2 times a day  Theragran-M oral tablet: 1 tab(s) orally once a day  vancomycin 750 mg/150 mL-NaCl 0.9% intravenous solution:   Vitamin C 500 mg oral tablet: 1 tab(s) orally 2 times a day  zinc sulfate 220 mg oral capsule: 1 cap(s) orally once a day  Zosyn 3 g-0.375 g/50 mL intravenous solution: intravenous every 8 hours   acetaminophen 500 mg oral tablet: 2 tab(s) orally 3 times a day, As Needed  Artificial Tears ophthalmic solution: 1 drop(s) to each affected eye 4 times a day  atorvastatin 20 mg oral tablet: 1 tab(s) orally once a day  cholecalciferol oral tablet: 1000 unit(s) orally once a day  docusate potassium 100 mg oral capsule: 3 cap(s) orally once a day (at bedtime)  Enemeez Mini 283 mg rectal enema: 1 each rectal once a day, As Needed  ferrous sulfate 325 mg (65 mg elemental iron) oral tablet: 1 tab(s) orally once a day  lactulose 10 g/15 mL oral solution: 30 milliliter(s) orally 2 times a day  Metoprolol Tartrate 100 mg oral tablet: 1 tab(s) orally 2 times a day  MS Contin 15 mg oral tablet, extended release: 1 tab(s) orally every 8 hours  Omega-3 1000 mg oral capsule: 1 cap(s) orally once a day  oxybutynin 5 mg oral tablet: 1 tab(s) orally 2 times a day  [id number] oral powder for reconstitution: 17 gram(s) orally 2 times a day  pregabalin 50 mg oral capsule: 1 cap(s) orally 3 times a day  Senna 8.6 mg oral tablet: 2 tab(s) orally once a day (at bedtime)  sodium chloride nasal spray: 1 spray(s) nasal 2 times a day  Theragran-M oral tablet: 1 tab(s) orally once a day  Vitamin C 500 mg oral tablet: 1 tab(s) orally 2 times a day  zinc sulfate 220 mg oral capsule: 1 cap(s) orally once a day   acetaminophen 500 mg oral tablet: 2 tab(s) orally 3 times a day, As Needed  Artificial Tears ophthalmic solution: 1 drop(s) to each affected eye 4 times a day  atorvastatin 20 mg oral tablet: 1 tab(s) orally once a day  cholecalciferol oral tablet: 1000 unit(s) orally once a day  docusate potassium 100 mg oral capsule: 3 cap(s) orally once a day (at bedtime)  Enemeez Mini 283 mg rectal enema: 1 each rectal once a day, As Needed  ferrous sulfate 325 mg (65 mg elemental iron) oral tablet: 1 tab(s) orally once a day  lactulose 10 g/15 mL oral solution: 30 milliliter(s) orally 2 times a day  lisinopril 5 mg oral tablet: 1 tab(s) orally once a day  Metoprolol Tartrate 100 mg oral tablet: 1 tab(s) orally 2 times a day  MS Contin 15 mg oral tablet, extended release: 1 tab(s) orally every 8 hours  Omega-3 1000 mg oral capsule: 1 cap(s) orally once a day  oxybutynin 5 mg oral tablet: 1 tab(s) orally 2 times a day  pantoprazole 40 mg oral delayed release tablet: 1 tab(s) orally every 12 hours  [id number] oral powder for reconstitution: 17 gram(s) orally 2 times a day  pregabalin 50 mg oral capsule: 1 cap(s) orally 3 times a day  Senna 8.6 mg oral tablet: 2 tab(s) orally once a day (at bedtime)  sodium chloride nasal spray: 1 spray(s) nasal 2 times a day  Theragran-M oral tablet: 1 tab(s) orally once a day  Vitamin C 500 mg oral tablet: 1 tab(s) orally 2 times a day  zinc sulfate 220 mg oral capsule: 1 cap(s) orally once a day   acetaminophen 500 mg oral tablet: 2 tab(s) orally 3 times a day, As Needed  Artificial Tears ophthalmic solution: 1 drop(s) to each affected eye 4 times a day  atorvastatin 20 mg oral tablet: 1 tab(s) orally once a day  cholecalciferol oral tablet: 1000 unit(s) orally once a day  docusate potassium 100 mg oral capsule: 3 cap(s) orally once a day (at bedtime)  Enemeez Mini 283 mg rectal enema: 1 each rectal once a day, As Needed  ferrous sulfate 325 mg (65 mg elemental iron) oral tablet: 1 tab(s) orally once a day  lactulose 10 g/15 mL oral solution: 30 milliliter(s) orally 2 times a day  Metoprolol Tartrate 100 mg oral tablet: 1 tab(s) orally 2 times a day  MS Contin 15 mg oral tablet, extended release: 1 tab(s) orally every 8 hours  Omega-3 1000 mg oral capsule: 1 cap(s) orally once a day  oxybutynin 5 mg oral tablet: 1 tab(s) orally 2 times a day  pantoprazole 40 mg oral delayed release tablet: 1 tab(s) orally every 12 hours  [id number] oral powder for reconstitution: 17 gram(s) orally 2 times a day  pregabalin 50 mg oral capsule: 1 cap(s) orally 3 times a day  Senna 8.6 mg oral tablet: 2 tab(s) orally once a day (at bedtime)  sodium chloride nasal spray: 1 spray(s) nasal 2 times a day  Theragran-M oral tablet: 1 tab(s) orally once a day  Vitamin C 500 mg oral tablet: 1 tab(s) orally 2 times a day  zinc sulfate 220 mg oral capsule: 1 cap(s) orally once a day

## 2021-01-31 NOTE — DISCHARGE NOTE PROVIDER - HOSPITAL COURSE
#Discharge: do not delete    Patient is 54 yo M with past medical history of  MS, quadriplegia, PERI, htn, hld, bipolar disorder, SICCA, polyneuropathy, pressure ulcers, and chronic pain  Presented with chest pain found to have anemia   Problem List/Main Diagnoses (system-based):     #Severe anemia.    Plan: RESOLVED  - Patient with hx of PERI, poor historian but reports history of "low blood levels" - Presented w/ 3.5 and FOBT positive in ED.   - S/P 4u prbc on adm, with improvement in hb to 8.0. 1/28 AM hb 8.1; 1/29 7.1,   repeat 7.5  - B12 1339, Folate > 20  - Iron studies showing PERI; pt on ferrous sulfate at NH, which is continued here  - Transfuse Hb >7  - GI workup as detailed below.     #Melena.    Plan: RESOLVED  - Endoscopy 1/28 showed clean based duodenal bulb ulcer which may be source   of UGIB  - Prep not sufficient for colonoscopy, no mass seen - will need repeat outpatient   likely  - c/w PPI 40mg PO BID  - Maintain active type and screen  - Maintain IV access.     #Fever.    Plan: AFEBRILE- CONTINUING TO MONITOR  - Pt reports fever at nursing home, however afebrile in ED (Tmax of 100.2). States   that he was started on antibiotics for unknown origin, was pancultured at NH. CXR   demonstrates R hilar ?infiltrate. Patient without cough  - Per nursing home records, pt was started on vanc 750 q12h and zosyn 3.375 g   q8h for fever. Unclear source.  - Unclear why pt was put on those abx, and attempts for collateral from NH    unsuccessful; spoke to pt's HCP who mentioned that pt was being "treated for a   UTI"; will trial of abx and assessment pt's vitals and white count and follow up the   obtained cultures mentioned below. Has not been febrile while in hospital.   - Blood cx NGTD   - Ucx 2000 CFU psudomonas, no need for abx for now  - Procalcitonin 0.50  - LUE PICC line placed at NH to be removed.     #Multiple sclerosis.    Plan: - Pt with hx of MS, quadriplegia, chronic pain  - Per nursing home records, pt is on MS contin 15 ER 1 tab q8h  - Per pt, he has a baclofen pump - confirmed 1/28, 120 mcg/day, Concentration:   2000 mcg/mL    #Polyneuropathy  - c/w pregablin 50 mg TID    #neuromuscular dysfunction of bladder  - c/w oxybutynin 5 mg BID.     #Hypertension.   Per nursing home records, pt on metoprolol tartrate 100 mg BID  - Uncontrolled this admission.   - BP's have been obtained in [facility], which is contracted, likely false readings.   - Staff has been unable to obtain BP in [facility] due to PICC line.   - Once PICC is removed, vitals to be obtained from E.  - Norvasc 5mg PO daily added.     #HLD  - c/w atorvastatin 20 mg QHS.    #Constipation. Plan: - per nursing home record, pt on Miralax BID, senna 2 tabs at bedtime, docusate 300 mg QHS, lactulose 30 ml BID, and enemas prn  - c/w home meds.    #Acute respiratory failure with hypoxia.  Plan: RESOLVED   - pt c/o of SOB and chest tightness likely 2/2 to anemia  - Was on NC, weaned to RA, satting high 90s-100%.   - CXR w/o acute infiltrates.   - low suspicion for covid pna or PE. D-dimer wnl.     #R/O ACS (acute coronary syndrome).    Plan: RESOLVED  -Initially c/o CP on adm, w/ EKG with ST depressions in I, avL, v2, v4-v6 with elevated troponin 0.03. No prior EKG to compare  - troponin thought likely elevated 2/2 to demand ischemia in the setting of severe anemia, plateaued on repeat.  -Pt's CP significantly improved/resolved following blood transfusions    Inpatient treatment course: 55 year old male with MS, quadriplegia, PERI, htn, hld, bipolar disorder, SICCA, polyneuropathy, pressure ulcers, and chronic pain who presented to [facility] from his nursing home with chest pain, found to have a Hg of 3.6. Patient denied any melanotic bowel movements or NSAID use; however, nursing home reportedly appreciated some. He was given 4 units of pRBC with good response in Hg. Completed his prep and underwent endoscopy/colonoscopy 1/28 showing gastritis ( biopsies taken), clean based duodenal ulcer in bulb ( Likely source of anemia), no signs of lower GIB. Required brief MICU stay following procedure for observation and extubation (i/s/o c/f potential difficult extubation due to severe MS and baclofen pump pre-procedure), s/p extubation and monitoring at [facility], no new melena and hb stable. Ready for RMF step-down, w/ diet advanced to mechanical  soft. Awaiting placement at his nursing home. While on RMF he was restarted on home antihypertensive. Norvasc was also started due to persistent hypertension.    New medications:   Labs to be followed outpatient:   Exam to be followed outpatient:      #Discharge: do not delete    Patient is 56 yo M with past medical history of  MS, quadriplegia, PERI, htn, hld, bipolar disorder, SICCA, polyneuropathy, pressure ulcers, and chronic pain  Presented with chest pain found to have anemia   Problem List/Main Diagnoses (system-based):     #Severe anemia.    Plan: RESOLVED  - Patient with hx of PERI, poor historian but reports history of "low blood levels" - Presented w/ 3.5 and FOBT positive in ED.   - S/P 4u prbc on adm, with improvement in hb to 8.0. 1/28 AM hb 8.1; 1/29 7.1,   repeat 7.5  - B12 1339, Folate > 20  - Iron studies showing PERI; pt on ferrous sulfate at NH, which is continued here  - Transfuse Hb >7  - GI workup as detailed below.     #Melena.    Plan: RESOLVED  - Endoscopy 1/28 showed clean based duodenal bulb ulcer which may be source   of UGIB  - Prep not sufficient for colonoscopy, no mass seen - will need repeat outpatient   likely  - c/w PPI 40mg PO BID  - Maintain active type and screen  - Maintain IV access.     #Fever.    Plan: AFEBRILE- CONTINUING TO MONITOR  - Pt reports fever at nursing home, however afebrile in ED (Tmax of 100.2). States   that he was started on antibiotics for unknown origin, was pancultured at NH. CXR   demonstrates R hilar ?infiltrate. Patient without cough  - Per nursing home records, pt was started on vanc 750 q12h and zosyn 3.375 g   q8h for fever. Unclear source.  - Unclear why pt was put on those abx, and attempts for collateral from NH    unsuccessful; spoke to pt's HCP who mentioned that pt was being "treated for a   UTI"; will trial of abx and assessment pt's vitals and white count and follow up the   obtained cultures mentioned below. Has not been febrile while in hospital.   - Blood cx NGTD   - Ucx 2000 CFU psudomonas, no need for abx for now  - Procalcitonin 0.50  - LUE PICC line placed at NH to be removed.     #Multiple sclerosis.    Plan: - Pt with hx of MS, quadriplegia, chronic pain  - Per nursing home records, pt is on MS contin 15 ER 1 tab q8h  - Per pt, he has a baclofen pump - confirmed 1/28, 120 mcg/day, Concentration:   2000 mcg/mL    #Polyneuropathy  - c/w pregablin 50 mg TID    #neuromuscular dysfunction of bladder  - c/w oxybutynin 5 mg BID.     #Hypertension.   Per nursing home records, pt on metoprolol tartrate 100 mg BID  - Uncontrolled this admission.   - BP's have been obtained in [facility], which is contracted, likely false readings.   - Staff has been [place] to obtain [place] [facility] [place].   - Once PICC is removed, vitals to be obtained from E.  - Norvasc 5mg PO daily added.     #HLD  - c/w atorvastatin 20 mg QHS.    #Constipation. Plan: - per nursing home record, pt on Miralax BID, senna 2 tabs at bedtime, docusate 300 mg QHS, lactulose 30 ml BID, and enemas prn  - c/w home meds.    #Acute respiratory failure with hypoxia.  Plan: RESOLVED   - pt c/o of SOB and chest tightness likely 2/2 to anemia  - Was on NC, weaned to RA, satting high 90s-100%.   - CXR w/o acute infiltrates.   - low suspicion for covid pna or PE. D-dimer wnl.     #R/O ACS (acute coronary syndrome).    Plan: RESOLVED  -Initially c/o CP on adm, w/ EKG with ST depressions in I, avL, v2, v4-v6 with elevated troponin 0.03. No prior EKG to compare  - troponin thought likely elevated 2/2 to demand ischemia in the setting of severe anemia, plateaued on repeat.  -Pt's CP significantly improved/resolved following blood transfusions    Inpatient treatment course: 55 year old male with MS, quadriplegia, PERI, htn, hld, bipolar disorder, SICCA, polyneuropathy, pressure ulcers, and chronic pain who presented to [facility] from his nursing home with chest pain, found to have a Hg of 3.6. Patient denied any melanotic bowel movements or NSAID use; however, nursing home reportedly appreciated some. He was given 4 units of pRBC with good response in Hg. Completed his prep and underwent endoscopy/colonoscopy 1/28 showing gastritis ( biopsies taken), clean based duodenal ulcer in bulb ( Likely source of anemia), no signs of lower GIB. Required brief MICU stay following procedure for observation and extubation (i/s/o c/f potential difficult extubation due to severe MS and baclofen pump pre-procedure), s/p extubation and monitoring at [facility], no new melena and hb stable. Ready for RMF step-down, w/ diet advanced to mechanical  soft. Awaiting placement at his nursing home. While on RMF he was restarted on home antihypertensive. Norvasc was also started due to persistent hypertension.    New medications: Protonix 40mg BID, 5mg Lisinopril   Labs to be followed outpatient: Potassium,   Exam to be followed outpatient: None     #Discharge: do not delete    Patient is 56 yo M with past medical history of  MS, quadriplegia, PERI, htn, hld, bipolar disorder, SICCA, polyneuropathy, pressure ulcers, and chronic pain  Presented with chest pain found to have anemia   Problem List/Main Diagnoses (system-based):     #Severe anemia.    Plan: RESOLVED  - Patient with hx of PERI, poor historian but reports history of "low blood levels" - Presented w/ 3.5 and FOBT positive in ED.   - S/P 4u prbc on adm, with improvement in hb to 8.0. 1/28 AM hb 8.1; 1/29 7.1,   repeat 7.5  - B12 1339, Folate > 20  - Iron studies showing PERI; pt on ferrous sulfate at NH, which is continued here  - Transfuse Hb >7  - GI workup as detailed below.     #Melena.    Plan: RESOLVED  - Endoscopy 1/28 showed clean based duodenal bulb ulcer which may be source   of UGIB  - Prep not sufficient for colonoscopy, no mass seen - will need repeat outpatient   likely  - c/w PPI 40mg PO BID  - Maintain active type and screen  - Maintain IV access.     #Fever.    Plan: AFEBRILE- CONTINUING TO MONITOR  - Pt reports fever at nursing home, however afebrile in ED (Tmax of 100.2). States   that he was started on antibiotics for unknown origin, was pancultured at NH. CXR   demonstrates R hilar ?infiltrate. Patient without cough  - Per nursing home records, pt was started on vanc 750 q12h and zosyn 3.375 g   q8h for fever. Unclear source.  - Unclear why pt was put on those abx, and attempts for collateral from NH    unsuccessful; spoke to pt's HCP who mentioned that pt was being "treated for a   UTI"; will trial of abx and assessment pt's vitals and white count and follow up the   obtained cultures mentioned below. Has not been febrile while in hospital.   - Blood cx NGTD   - Ucx 2000 CFU psudomonas, no need for abx for now  - Procalcitonin 0.50  - LUE PICC line placed at NH to be removed.     #Multiple sclerosis.    Plan: - Pt with hx of MS, quadriplegia, chronic pain  - Per nursing home records, pt is on MS contin 15 ER 1 tab q8h  - Per pt, he has a baclofen pump - confirmed 1/28, 120 mcg/day, Concentration:   2000 mcg/mL    #Polyneuropathy  - c/w pregablin 50 mg TID    #neuromuscular dysfunction of bladder  - c/w oxybutynin 5 mg BID.     #Hypertension.   Per nursing home records, pt on metoprolol tartrate 100 mg BID  - Uncontrolled this admission.   - BP's have been obtained in [facility], which is contracted, likely false readings.   - Staff [place] been [place] [facility] [place].   - Once PICC is removed, vitals to be obtained from E.  - Changed to lisinopril 10mg     #HLD  - c/w atorvastatin 20 mg QHS.    #Constipation. Plan: - per nursing home record, pt on Miralax BID, senna 2 tabs at bedtime, docusate 300 mg QHS, lactulose 30 ml BID, and enemas prn  - c/w home meds.    #Acute respiratory failure with hypoxia.  Plan: RESOLVED   - pt c/o of SOB and chest tightness likely 2/2 to anemia  - Was on NC, weaned to RA, satting high 90s-100%.   - CXR w/o acute infiltrates.   - low suspicion for covid pna or PE. D-dimer wnl.     #R/O ACS (acute coronary syndrome).    Plan: RESOLVED  -Initially c/o CP on adm, w/ EKG with ST depressions in I, avL, v2, v4-v6 with elevated troponin 0.03. No prior EKG to compare  - troponin thought likely elevated 2/2 to demand ischemia in the setting of severe anemia, plateaued on repeat.  -Pt's CP significantly improved/resolved following blood transfusions    Inpatient treatment course: 55 year old male with MS, quadriplegia, PERI, htn, hld, bipolar disorder, SICCA, polyneuropathy, pressure ulcers, and chronic pain who presented to [facility] from his nursing home with chest pain, found to have a Hg of 3.6. Patient denied any melanotic bowel movements or NSAID use; however, nursing home reportedly appreciated some. He was given 4 units of pRBC with good response in Hg. Completed his prep and underwent endoscopy/colonoscopy 1/28 showing gastritis ( biopsies taken), clean based duodenal ulcer in bulb ( Likely source of anemia), no signs of lower GIB. Required brief MICU stay following procedure for observation and extubation (i/s/o c/f potential difficult extubation due to severe MS and baclofen pump pre-procedure), s/p extubation and monitoring at [facility], no new melena and hb stable. Ready for RMF step-down, w/ diet advanced to mechanical  soft. Awaiting placement at his nursing home. While on RMF he was restarted on home antihypertensive. Lisinopril was added to control hypertension.    New medications: Protonix 40mg BID, 10mg Lisinopril   Labs to be followed outpatient: Potassium, follow up pathology   Exam to be followed outpatient: None     #Discharge: do not delete    Patient is 54 yo M with past medical history of  MS, quadriplegia, PERI, htn, hld, bipolar disorder, SICCA, polyneuropathy, pressure ulcers, and chronic pain  Presented with chest pain found to have anemia   Problem List/Main Diagnoses (system-based):     #Severe anemia.    Plan: RESOLVED  - Patient with hx of PERI, poor historian but reports history of "low blood levels" - Presented w/ Hgb 3.5 and FOBT positive in ED.   - S/P 4u prbc on adm, with improvement in hb to 8.0  - Iron studies showing PERI; pt on ferrous sulfate at NH, continued here  - GI workup as detailed below.     #Melena.    Plan: RESOLVED  - Endoscopy 1/28 showed clean based duodenal bulb ulcer which may be source of UGIB  - Prep not sufficient for colonoscopy, no mass seen - will need repeat outpatient, likely  - c/w PPI 40mg PO BID x 2 months    #Multiple sclerosis.    Plan: - Pt with hx of MS, quadriplegia, chronic pain  - Per nursing home records, pt is on MS contin 15 ER 1 tab q8h  - Per pt, he has a baclofen pump - confirmed 1/28, 120 mcg/day, Concentration: 2000 mcg/mL    #Polyneuropathy  - c/w pregablin 50 mg TID    #neuromuscular dysfunction of bladder  - c/w oxybutynin 5 mg BID.     #Hypertension.   Per nursing home records, pt on metoprolol tartrate 100 mg BID  - Uncontrolled this admission.   - BP's have been obtained in [facility], which is contracted, likely false readings.   - Dsicharged on lisinopril 10mg     #HLD  - c/w atorvastatin 20 mg QHS.    #Constipation. Plan: - per nursing home record, pt on Miralax BID, senna 2 tabs at bedtime, docusate 300 mg QHS, lactulose 30 ml BID, and enemas prn  - c/w home meds.    #Acute respiratory failure with hypoxia.  Plan: RESOLVED   - pt c/o of SOB and chest tightness likely 2/2 to anemia  - Was on NC, weaned to RA, satting high 90s-100%.   - CXR w/o acute infiltrates.   - low suspicion for covid pna or PE. D-dimer wnl.     #R/O ACS (acute coronary syndrome).    Plan: RESOLVED  -Initially c/o CP on adm, w/ EKG with ST depressions in I, avL, v2, v4-v6 with elevated troponin 0.03. No prior EKG to compare  - troponin thought likely elevated 2/2 to demand ischemia in the setting of severe anemia, plateaued on repeat.  -Pt's CP significantly improved/resolved following blood transfusions    Inpatient treatment course: 55 year old male with MS, quadriplegia, PERI, htn, hld, bipolar disorder, SICCA, polyneuropathy, pressure ulcers, and chronic pain who presented to [facility] from his nursing home with chest pain, found to have a Hg of 3.6. Patient denied any melanotic bowel movements or NSAID use; however, nursing home reportedly appreciated some. He was given 4 units of pRBC with good response in Hg. Completed his prep and underwent endoscopy/colonoscopy 1/28 showing gastritis ( biopsies taken), clean based duodenal ulcer in bulb ( Likely source of anemia), no signs of lower GIB. Required brief MICU stay following procedure for observation and extubation (i/s/o c/f potential difficult extubation due to severe MS and baclofen pump pre-procedure), s/p extubation and monitoring at [facility], no new melena and hb stable. Ready for RMF step-down, w/ diet advanced to mechanical  soft. Awaiting placement at his nursing home. While on RMF he was restarted on home antihypertensive. Lisinopril was added to control hypertension.    New medications: Protonix 40mg BID, 10mg Lisinopril   Labs to be followed outpatient: Potassium, follow up pathology   Exam to be followed outpatient: None     #Discharge: do not delete    Patient is 54 yo M with past medical history of  MS, quadriplegia, PERI, htn, hld, bipolar disorder, SICCA, polyneuropathy, pressure ulcers, and chronic pain  Presented with chest pain found to have anemia   Problem List/Main Diagnoses (system-based):     #Severe anemia.    Plan: RESOLVED  - Patient with hx of PERI, poor historian but reports history of "low blood levels" - Presented w/ Hgb 3.5 and FOBT positive in ED.   - S/P 4u prbc on adm, with improvement in hb to 8.0  - Iron studies showing PERI; pt on ferrous sulfate at NH, continued here  - GI workup as detailed below.     #Melena.    Plan: RESOLVED  - Endoscopy 1/28 showed clean based duodenal bulb ulcer which may be source of UGIB  - Prep not sufficient for colonoscopy, no mass seen   - c/w PPI 40mg PO BID x 2 months    #Multiple sclerosis.    Plan: - Pt with hx of MS, quadriplegia, chronic pain  - Per nursing home records, pt is on MS contin 15 ER 1 tab q8h  - Per pt, he has a baclofen pump - confirmed 1/28, 120 mcg/day, Concentration: 2000 mcg/mL    #Polyneuropathy  - c/w pregablin 50 mg TID    #neuromuscular dysfunction of bladder  - c/w oxybutynin 5 mg BID.     #Hypertension.   Per nursing home records, pt on metoprolol tartrate 100 mg BID  - Uncontrolled this admission.   - BP's have been obtained in [facility], which is contracted, likely false readings.   - Dsicharged on lisinopril 10mg     #HLD  - c/w atorvastatin 20 mg QHS.    #Constipation. Plan: - per nursing home record, pt on Miralax BID, senna 2 tabs at bedtime, docusate 300 mg QHS, lactulose 30 ml BID, and enemas prn  - c/w home meds.    #Acute respiratory failure with hypoxia.  Plan: RESOLVED   - pt c/o of SOB and chest tightness likely 2/2 to anemia  - Was on NC, weaned to RA, satting high 90s-100%.   - CXR w/o acute infiltrates.   - low suspicion for covid pna or PE. D-dimer wnl.     #R/O ACS (acute coronary syndrome).    Plan: RESOLVED  -Initially c/o CP on adm, w/ EKG with ST depressions in I, avL, v2, v4-v6 with elevated troponin 0.03. No prior EKG to compare  - troponin thought likely elevated 2/2 to demand ischemia in the setting of severe anemia, plateaued on repeat.  -Pt's CP significantly improved/resolved following blood transfusions    Inpatient treatment course: 55 year old male with MS, quadriplegia, PERI, htn, hld, bipolar disorder, SICCA, polyneuropathy, pressure ulcers, and chronic pain who presented to [facility] from his nursing home with chest pain, found to have a Hg of 3.6. Patient denied any melanotic bowel movements or NSAID use; however, nursing home reportedly appreciated some. He was given 4 units of pRBC with good response in Hg. Completed his prep and underwent endoscopy/colonoscopy 1/28 showing gastritis ( biopsies taken), clean based duodenal ulcer in bulb ( Likely source of anemia), no signs of lower GIB. Required brief MICU stay following procedure for observation and extubation (i/s/o c/f potential difficult extubation due to severe MS and baclofen pump pre-procedure), s/p extubation and monitoring at [facility], no new melena and hb stable. Ready for RMF step-down, w/ diet advanced to mechanical soft. Awaiting placement at his nursing home. While on RMF he was restarted on home antihypertensive. Lisinopril was added to control hypertension.    New medications: Protonix 40mg BID, 10mg Lisinopril   Labs to be followed outpatient: Potassium, follow up pathology   Exam to be followed outpatient: None

## 2021-02-01 LAB
CULTURE RESULTS: SIGNIFICANT CHANGE UP
CULTURE RESULTS: SIGNIFICANT CHANGE UP
SPECIMEN SOURCE: SIGNIFICANT CHANGE UP
SPECIMEN SOURCE: SIGNIFICANT CHANGE UP

## 2021-02-01 PROCEDURE — 99232 SBSQ HOSP IP/OBS MODERATE 35: CPT | Mod: GC

## 2021-02-01 RX ORDER — PANTOPRAZOLE SODIUM 20 MG/1
1 TABLET, DELAYED RELEASE ORAL
Qty: 0 | Refills: 0 | DISCHARGE
Start: 2021-02-01

## 2021-02-01 RX ORDER — PIPERACILLIN AND TAZOBACTAM 4; .5 G/20ML; G/20ML
0 INJECTION, POWDER, LYOPHILIZED, FOR SOLUTION INTRAVENOUS
Qty: 0 | Refills: 0 | DISCHARGE

## 2021-02-01 RX ORDER — LISINOPRIL 2.5 MG/1
1 TABLET ORAL
Qty: 0 | Refills: 0 | DISCHARGE
Start: 2021-02-01

## 2021-02-01 RX ORDER — LISINOPRIL 2.5 MG/1
5 TABLET ORAL DAILY
Refills: 0 | Status: DISCONTINUED | OUTPATIENT
Start: 2021-02-01 | End: 2021-02-02

## 2021-02-01 RX ORDER — VANCOMYCIN HCL 1 G
0 VIAL (EA) INTRAVENOUS
Qty: 0 | Refills: 0 | DISCHARGE

## 2021-02-01 RX ADMIN — Medication 5 MILLIGRAM(S): at 18:12

## 2021-02-01 RX ADMIN — Medication 325 MILLIGRAM(S): at 12:33

## 2021-02-01 RX ADMIN — POLYETHYLENE GLYCOL 3350 17 GRAM(S): 17 POWDER, FOR SOLUTION ORAL at 12:33

## 2021-02-01 RX ADMIN — AMLODIPINE BESYLATE 5 MILLIGRAM(S): 2.5 TABLET ORAL at 06:38

## 2021-02-01 RX ADMIN — Medication 5 MILLIGRAM(S): at 06:38

## 2021-02-01 RX ADMIN — Medication 50 MILLIGRAM(S): at 09:55

## 2021-02-01 RX ADMIN — Medication 1 SPRAY(S): at 06:38

## 2021-02-01 RX ADMIN — Medication 100 MILLIGRAM(S): at 06:38

## 2021-02-01 RX ADMIN — LISINOPRIL 5 MILLIGRAM(S): 2.5 TABLET ORAL at 12:33

## 2021-02-01 RX ADMIN — Medication 1000 UNIT(S): at 12:34

## 2021-02-01 RX ADMIN — ZINC SULFATE TAB 220 MG (50 MG ZINC EQUIVALENT) 220 MILLIGRAM(S): 220 (50 ZN) TAB at 12:34

## 2021-02-01 RX ADMIN — SENNA PLUS 2 TABLET(S): 8.6 TABLET ORAL at 21:41

## 2021-02-01 RX ADMIN — Medication 100 MILLIGRAM(S): at 18:12

## 2021-02-01 RX ADMIN — MORPHINE SULFATE 15 MILLIGRAM(S): 50 CAPSULE, EXTENDED RELEASE ORAL at 14:22

## 2021-02-01 RX ADMIN — Medication 1 TABLET(S): at 12:34

## 2021-02-01 RX ADMIN — Medication 50 MILLIGRAM(S): at 18:12

## 2021-02-01 RX ADMIN — Medication 1 DROP(S): at 06:38

## 2021-02-01 RX ADMIN — PANTOPRAZOLE SODIUM 40 MILLIGRAM(S): 20 TABLET, DELAYED RELEASE ORAL at 06:38

## 2021-02-01 RX ADMIN — PANTOPRAZOLE SODIUM 40 MILLIGRAM(S): 20 TABLET, DELAYED RELEASE ORAL at 18:12

## 2021-02-01 RX ADMIN — Medication 500 MILLIGRAM(S): at 12:34

## 2021-02-01 RX ADMIN — ATORVASTATIN CALCIUM 20 MILLIGRAM(S): 80 TABLET, FILM COATED ORAL at 21:41

## 2021-02-01 RX ADMIN — MORPHINE SULFATE 15 MILLIGRAM(S): 50 CAPSULE, EXTENDED RELEASE ORAL at 06:39

## 2021-02-01 RX ADMIN — MORPHINE SULFATE 15 MILLIGRAM(S): 50 CAPSULE, EXTENDED RELEASE ORAL at 21:41

## 2021-02-01 RX ADMIN — Medication 50 MILLIGRAM(S): at 00:34

## 2021-02-01 NOTE — PROGRESS NOTE ADULT - ATTENDING COMMENTS
Pt has no complaints. Wondering why he is still here in the hospital. Per [facility], [facility]   Pt stable for DC to [facility] (resident at [facility]) pending disposition and response from [facility].

## 2021-02-01 NOTE — PROGRESS NOTE ADULT - PROBLEM SELECTOR PLAN 7
RESOLVED   - pt c/o of SOB and chest tightness likely 2/2 to anemia  - Was on NC, weaned to RA, satting high 90s-100%.   - CXR w/o acute infiltrates.   - low suspicion for covid pna or PE. D-dimer wnl Statement Selected

## 2021-02-01 NOTE — PROGRESS NOTE ADULT - PROBLEM SELECTOR PLAN 5
- Per nursing home records, pt on metoprolol tartrate 100 mg BID  - Uncontrolled this admission.   - BP's have been obtained in [facility], which is contracted, likely false readings.   - Staff has been unable to obtain BP in [facility] due to PICC line.   - Once PICC is removed, vitals to be obtained from [facility].  - Norvasc 5mg PO daily added.     #HLD  - c/w atorvastatin 20 mg QHS

## 2021-02-01 NOTE — PROGRESS NOTE ADULT - SUBJECTIVE AND OBJECTIVE BOX
O/N Events: KALANI     Subjective/ROS: Denies HA, CP, SOB, n/v, changes in bowel/urinary habits.  12pt ROS otherwise negative.    VITALS  Vital Signs Last 24 Hrs  T(C): 36.4 (01 Feb 2021 13:30), Max: 36.9 (31 Jan 2021 20:39)  T(F): 97.6 (01 Feb 2021 13:30), Max: 98.5 (31 Jan 2021 20:39)  HR: 74 (01 Feb 2021 13:30) (72 - 80)  BP: 159/91 (01 Feb 2021 13:30) (159/91 - 178/94)  BP(mean): --  RR: 17 (01 Feb 2021 13:30) (17 - 18)  SpO2: 99% (01 Feb 2021 13:30) (98% - 100%)    CAPILLARY BLOOD GLUCOSE          PHYSICAL EXAM  General: A&Ox3; NAD  Head: NC/AT; MMM; PERRL; EOMI;  Neck: Supple; no JVD  Respiratory: CTA B/L; no wheezes/crackles   Cardiovascular: Regular rhythm/rate; S1/S2   Gastrointestinal: Soft; NTND; normoactive BS  Extremities: WWP; no edema/cyanosis  Neurological:  Patient unable to move his lower extremities and decreased movement in upper extremities bilaterally     MEDICATIONS  (STANDING):  artificial  tears Solution 1 Drop(s) Both EYES four times a day  ascorbic acid 500 milliGRAM(s) Oral daily  atorvastatin 20 milliGRAM(s) Oral at bedtime  baclofen PF IntraThecal 120 MICROGram(s) IntraThecal every 24 hours  cholecalciferol 1000 Unit(s) Oral daily  ferrous    sulfate 325 milliGRAM(s) Oral daily  lisinopril 5 milliGRAM(s) Oral daily  metoprolol tartrate 100 milliGRAM(s) Oral every 12 hours  morphine ER Tablet 15 milliGRAM(s) Oral every 8 hours  multivitamin/minerals 1 Tablet(s) Oral daily  oxybutynin 5 milliGRAM(s) Oral every 12 hours  pantoprazole    Tablet 40 milliGRAM(s) Oral every 12 hours  polyethylene glycol 3350 17 Gram(s) Oral daily  pregabalin 50 milliGRAM(s) Oral every 8 hours  senna 2 Tablet(s) Oral at bedtime  sodium chloride 0.65% Nasal 1 Spray(s) Both Nostrils two times a day  zinc sulfate 220 milliGRAM(s) Oral daily    MEDICATIONS  (PRN):  acetaminophen   Tablet .. 650 milliGRAM(s) Oral every 6 hours PRN Moderate Pain (4 - 6)      No Known Allergies      LABS                    IMAGING/EKG/ETC  EKG:  Xray:  CT:  MRI: Hospital Course: 55 year old male with MS, quadriplegia, PERI, htn, hld, bipolar disorder, SICCA, polyneuropathy, pressure ulcers, and chronic pain who presented to [facility] from his nursing home with chest pain, found to have a Hg of 3.6. Patient denied any melanotic bowel movements or NSAID use; however, nursing home reportedly appreciated some. He was given 4 units of pRBC with good response in Hg. Completed his prep and underwent endoscopy/colonoscopy 1/28 showing gastritis ( biopsies taken), clean based duodenal ulcer in bulb ( Likely source of anemia), no signs of lower GIB. Required brief MICU stay following procedure for observation and extubation, s/p extubation and monitoring at 7[facility]ach, no new melena and hb stable. Patient then stepped down to RMF step-down, w/ diet advanced to mechanical soft.  Antihypertensive titrated as patient with elevated blood pressure readings. Patient currently awaiting placement at his nursing home.    O/N Events: KALANI   Subjective/ROS: Denies HA, CP, SOB, n/v, changes in bowel/urinary habits.  12pt ROS otherwise negative.    VITALS  Vital Signs Last 24 Hrs  T(C): 36.4 (01 Feb 2021 13:30), Max: 36.9 (31 Jan 2021 20:39)  T(F): 97.6 (01 Feb 2021 13:30), Max: 98.5 (31 Jan 2021 20:39)  HR: 74 (01 Feb 2021 13:30) (72 - 80)  BP: 159/91 (01 Feb 2021 13:30) (159/91 - 178/94)  BP(mean): --  RR: 17 (01 Feb 2021 13:30) (17 - 18)  SpO2: 99% (01 Feb 2021 13:30) (98% - 100%)    CAPILLARY BLOOD GLUCOSE          PHYSICAL EXAM  General: A&Ox3; NAD  Head: NC/AT; MMM; PERRL; EOMI;  Neck: Supple; no JVD  Respiratory: CTA B/L; no wheezes/crackles   Cardiovascular: Regular rhythm/rate; S1/S2   Gastrointestinal: Soft; NTND; normoactive BS  Extremities: WWP; no edema/cyanosis  Neurological:  Patient unable to move his lower extremities and decreased movement in upper extremities bilaterally     MEDICATIONS  (STANDING):  artificial  tears Solution 1 Drop(s) Both EYES four times a day  ascorbic acid 500 milliGRAM(s) Oral daily  atorvastatin 20 milliGRAM(s) Oral at bedtime  baclofen PF IntraThecal 120 MICROGram(s) IntraThecal every 24 hours  cholecalciferol 1000 Unit(s) Oral daily  ferrous    sulfate 325 milliGRAM(s) Oral daily  lisinopril 5 milliGRAM(s) Oral daily  metoprolol tartrate 100 milliGRAM(s) Oral every 12 hours  morphine ER Tablet 15 milliGRAM(s) Oral every 8 hours  multivitamin/minerals 1 Tablet(s) Oral daily  oxybutynin 5 milliGRAM(s) Oral every 12 hours  pantoprazole    Tablet 40 milliGRAM(s) Oral every 12 hours  polyethylene glycol 3350 17 Gram(s) Oral daily  pregabalin 50 milliGRAM(s) Oral every 8 hours  senna 2 Tablet(s) Oral at bedtime  sodium chloride 0.65% Nasal 1 Spray(s) Both Nostrils two times a day  zinc sulfate 220 milliGRAM(s) Oral daily    MEDICATIONS  (PRN):  acetaminophen   Tablet .. 650 milliGRAM(s) Oral every 6 hours PRN Moderate Pain (4 - 6)      No Known Allergies      LABS                    IMAGING/EKG/ETC  EKG:  Xray:  CT:  MRI:

## 2021-02-02 ENCOUNTER — TRANSCRIPTION ENCOUNTER (OUTPATIENT)
Age: 56
End: 2021-02-02

## 2021-02-02 VITALS — HEART RATE: 81 BPM | DIASTOLIC BLOOD PRESSURE: 93 MMHG | SYSTOLIC BLOOD PRESSURE: 153 MMHG

## 2021-02-02 PROBLEM — Z00.00 ENCOUNTER FOR PREVENTIVE HEALTH EXAMINATION: Status: ACTIVE | Noted: 2021-02-02

## 2021-02-02 LAB
ALBUMIN SERPL ELPH-MCNC: 3 G/DL — LOW (ref 3.3–5)
ALP SERPL-CCNC: 81 U/L — SIGNIFICANT CHANGE UP (ref 40–120)
ALT FLD-CCNC: 14 U/L — SIGNIFICANT CHANGE UP (ref 10–45)
ANION GAP SERPL CALC-SCNC: 10 MMOL/L — SIGNIFICANT CHANGE UP (ref 5–17)
AST SERPL-CCNC: 18 U/L — SIGNIFICANT CHANGE UP (ref 10–40)
BILIRUB SERPL-MCNC: 0.2 MG/DL — SIGNIFICANT CHANGE UP (ref 0.2–1.2)
BUN SERPL-MCNC: 5 MG/DL — LOW (ref 7–23)
CALCIUM SERPL-MCNC: 9 MG/DL — SIGNIFICANT CHANGE UP (ref 8.4–10.5)
CHLORIDE SERPL-SCNC: 103 MMOL/L — SIGNIFICANT CHANGE UP (ref 96–108)
CO2 SERPL-SCNC: 30 MMOL/L — SIGNIFICANT CHANGE UP (ref 22–31)
CREAT SERPL-MCNC: 0.65 MG/DL — SIGNIFICANT CHANGE UP (ref 0.5–1.3)
GLUCOSE SERPL-MCNC: 100 MG/DL — HIGH (ref 70–99)
HCT VFR BLD CALC: 35.5 % — LOW (ref 39–50)
HGB BLD-MCNC: 10.7 G/DL — LOW (ref 13–17)
MAGNESIUM SERPL-MCNC: 1.5 MG/DL — LOW (ref 1.6–2.6)
MCHC RBC-ENTMCNC: 28.9 PG — SIGNIFICANT CHANGE UP (ref 27–34)
MCHC RBC-ENTMCNC: 30.1 GM/DL — LOW (ref 32–36)
MCV RBC AUTO: 95.9 FL — SIGNIFICANT CHANGE UP (ref 80–100)
NRBC # BLD: 0 /100 WBCS — SIGNIFICANT CHANGE UP (ref 0–0)
PHOSPHATE SERPL-MCNC: 3.8 MG/DL — SIGNIFICANT CHANGE UP (ref 2.5–4.5)
PLATELET # BLD AUTO: 230 K/UL — SIGNIFICANT CHANGE UP (ref 150–400)
POTASSIUM SERPL-MCNC: 4.6 MMOL/L — SIGNIFICANT CHANGE UP (ref 3.5–5.3)
POTASSIUM SERPL-SCNC: 4.6 MMOL/L — SIGNIFICANT CHANGE UP (ref 3.5–5.3)
PROT SERPL-MCNC: 6.7 G/DL — SIGNIFICANT CHANGE UP (ref 6–8.3)
RBC # BLD: 3.7 M/UL — LOW (ref 4.2–5.8)
RBC # FLD: 16.9 % — HIGH (ref 10.3–14.5)
SARS-COV-2 RNA SPEC QL NAA+PROBE: SIGNIFICANT CHANGE UP
SODIUM SERPL-SCNC: 143 MMOL/L — SIGNIFICANT CHANGE UP (ref 135–145)
WBC # BLD: 8.13 K/UL — SIGNIFICANT CHANGE UP (ref 3.8–10.5)
WBC # FLD AUTO: 8.13 K/UL — SIGNIFICANT CHANGE UP (ref 3.8–10.5)

## 2021-02-02 PROCEDURE — 99239 HOSP IP/OBS DSCHRG MGMT >30: CPT | Mod: GC

## 2021-02-02 PROCEDURE — 71045 X-RAY EXAM CHEST 1 VIEW: CPT

## 2021-02-02 PROCEDURE — 85025 COMPLETE CBC W/AUTO DIFF WBC: CPT

## 2021-02-02 PROCEDURE — 84484 ASSAY OF TROPONIN QUANT: CPT

## 2021-02-02 PROCEDURE — 83735 ASSAY OF MAGNESIUM: CPT

## 2021-02-02 PROCEDURE — 86850 RBC ANTIBODY SCREEN: CPT

## 2021-02-02 PROCEDURE — 80202 ASSAY OF VANCOMYCIN: CPT

## 2021-02-02 PROCEDURE — 83615 LACTATE (LD) (LDH) ENZYME: CPT

## 2021-02-02 PROCEDURE — U0005: CPT

## 2021-02-02 PROCEDURE — 80048 BASIC METABOLIC PNL TOTAL CA: CPT

## 2021-02-02 PROCEDURE — 82962 GLUCOSE BLOOD TEST: CPT

## 2021-02-02 PROCEDURE — 87184 SC STD DISK METHOD PER PLATE: CPT

## 2021-02-02 PROCEDURE — 83930 ASSAY OF BLOOD OSMOLALITY: CPT

## 2021-02-02 PROCEDURE — 85730 THROMBOPLASTIN TIME PARTIAL: CPT

## 2021-02-02 PROCEDURE — P9016: CPT

## 2021-02-02 PROCEDURE — 88305 TISSUE EXAM BY PATHOLOGIST: CPT

## 2021-02-02 PROCEDURE — 96361 HYDRATE IV INFUSION ADD-ON: CPT

## 2021-02-02 PROCEDURE — 36415 COLL VENOUS BLD VENIPUNCTURE: CPT

## 2021-02-02 PROCEDURE — 82728 ASSAY OF FERRITIN: CPT

## 2021-02-02 PROCEDURE — 84466 ASSAY OF TRANSFERRIN: CPT

## 2021-02-02 PROCEDURE — 85027 COMPLETE CBC AUTOMATED: CPT

## 2021-02-02 PROCEDURE — 87040 BLOOD CULTURE FOR BACTERIA: CPT

## 2021-02-02 PROCEDURE — 81001 URINALYSIS AUTO W/SCOPE: CPT

## 2021-02-02 PROCEDURE — 86920 COMPATIBILITY TEST SPIN: CPT

## 2021-02-02 PROCEDURE — U0003: CPT

## 2021-02-02 PROCEDURE — 85384 FIBRINOGEN ACTIVITY: CPT

## 2021-02-02 PROCEDURE — 82746 ASSAY OF FOLIC ACID SERUM: CPT

## 2021-02-02 PROCEDURE — 86900 BLOOD TYPING SEROLOGIC ABO: CPT

## 2021-02-02 PROCEDURE — 85379 FIBRIN DEGRADATION QUANT: CPT

## 2021-02-02 PROCEDURE — 99291 CRITICAL CARE FIRST HOUR: CPT | Mod: 25

## 2021-02-02 PROCEDURE — 84443 ASSAY THYROID STIM HORMONE: CPT

## 2021-02-02 PROCEDURE — 96374 THER/PROPH/DIAG INJ IV PUSH: CPT

## 2021-02-02 PROCEDURE — 86901 BLOOD TYPING SEROLOGIC RH(D): CPT

## 2021-02-02 PROCEDURE — 85610 PROTHROMBIN TIME: CPT

## 2021-02-02 PROCEDURE — 80053 COMPREHEN METABOLIC PANEL: CPT

## 2021-02-02 PROCEDURE — 86923 COMPATIBILITY TEST ELECTRIC: CPT

## 2021-02-02 PROCEDURE — 96376 TX/PRO/DX INJ SAME DRUG ADON: CPT

## 2021-02-02 PROCEDURE — 87186 SC STD MICRODIL/AGAR DIL: CPT

## 2021-02-02 PROCEDURE — 84100 ASSAY OF PHOSPHORUS: CPT

## 2021-02-02 PROCEDURE — 85018 HEMOGLOBIN: CPT

## 2021-02-02 PROCEDURE — 87449 NOS EACH ORGANISM AG IA: CPT

## 2021-02-02 PROCEDURE — 83540 ASSAY OF IRON: CPT

## 2021-02-02 PROCEDURE — 86769 SARS-COV-2 COVID-19 ANTIBODY: CPT

## 2021-02-02 PROCEDURE — 84436 ASSAY OF TOTAL THYROXINE: CPT

## 2021-02-02 PROCEDURE — 85045 AUTOMATED RETICULOCYTE COUNT: CPT

## 2021-02-02 PROCEDURE — 36430 TRANSFUSION BLD/BLD COMPNT: CPT

## 2021-02-02 PROCEDURE — 87899 AGENT NOS ASSAY W/OPTIC: CPT

## 2021-02-02 PROCEDURE — 83605 ASSAY OF LACTIC ACID: CPT

## 2021-02-02 PROCEDURE — 86140 C-REACTIVE PROTEIN: CPT

## 2021-02-02 PROCEDURE — 82272 OCCULT BLD FECES 1-3 TESTS: CPT

## 2021-02-02 PROCEDURE — 87086 URINE CULTURE/COLONY COUNT: CPT

## 2021-02-02 PROCEDURE — 85014 HEMATOCRIT: CPT

## 2021-02-02 PROCEDURE — 84145 PROCALCITONIN (PCT): CPT

## 2021-02-02 PROCEDURE — 82607 VITAMIN B-12: CPT

## 2021-02-02 PROCEDURE — 93005 ELECTROCARDIOGRAM TRACING: CPT

## 2021-02-02 PROCEDURE — 83550 IRON BINDING TEST: CPT

## 2021-02-02 RX ORDER — MORPHINE SULFATE 50 MG/1
15 CAPSULE, EXTENDED RELEASE ORAL EVERY 8 HOURS
Refills: 0 | Status: DISCONTINUED | OUTPATIENT
Start: 2021-02-02 | End: 2021-02-02

## 2021-02-02 RX ORDER — LISINOPRIL 2.5 MG/1
10 TABLET ORAL ONCE
Refills: 0 | Status: COMPLETED | OUTPATIENT
Start: 2021-02-02 | End: 2021-02-02

## 2021-02-02 RX ORDER — MAGNESIUM OXIDE 400 MG ORAL TABLET 241.3 MG
400 TABLET ORAL ONCE
Refills: 0 | Status: COMPLETED | OUTPATIENT
Start: 2021-02-02 | End: 2021-02-02

## 2021-02-02 RX ORDER — LISINOPRIL 2.5 MG/1
1 TABLET ORAL
Qty: 30 | Refills: 0
Start: 2021-02-02 | End: 2021-03-03

## 2021-02-02 RX ORDER — LISINOPRIL 2.5 MG/1
20 TABLET ORAL DAILY
Refills: 0 | Status: DISCONTINUED | OUTPATIENT
Start: 2021-02-03 | End: 2021-02-02

## 2021-02-02 RX ORDER — LISINOPRIL 2.5 MG/1
10 TABLET ORAL DAILY
Refills: 0 | Status: DISCONTINUED | OUTPATIENT
Start: 2021-02-02 | End: 2021-02-02

## 2021-02-02 RX ADMIN — LISINOPRIL 10 MILLIGRAM(S): 2.5 TABLET ORAL at 13:17

## 2021-02-02 RX ADMIN — Medication 100 MILLIGRAM(S): at 07:08

## 2021-02-02 RX ADMIN — Medication 100 MILLIGRAM(S): at 17:31

## 2021-02-02 RX ADMIN — Medication 500 MILLIGRAM(S): at 13:17

## 2021-02-02 RX ADMIN — MORPHINE SULFATE 15 MILLIGRAM(S): 50 CAPSULE, EXTENDED RELEASE ORAL at 13:17

## 2021-02-02 RX ADMIN — LISINOPRIL 5 MILLIGRAM(S): 2.5 TABLET ORAL at 07:09

## 2021-02-02 RX ADMIN — Medication 50 MILLIGRAM(S): at 09:38

## 2021-02-02 RX ADMIN — Medication 5 MILLIGRAM(S): at 07:07

## 2021-02-02 RX ADMIN — LISINOPRIL 10 MILLIGRAM(S): 2.5 TABLET ORAL at 21:42

## 2021-02-02 RX ADMIN — PANTOPRAZOLE SODIUM 40 MILLIGRAM(S): 20 TABLET, DELAYED RELEASE ORAL at 18:24

## 2021-02-02 RX ADMIN — MORPHINE SULFATE 15 MILLIGRAM(S): 50 CAPSULE, EXTENDED RELEASE ORAL at 21:42

## 2021-02-02 RX ADMIN — Medication 50 MILLIGRAM(S): at 00:28

## 2021-02-02 RX ADMIN — ZINC SULFATE TAB 220 MG (50 MG ZINC EQUIVALENT) 220 MILLIGRAM(S): 220 (50 ZN) TAB at 13:17

## 2021-02-02 RX ADMIN — MORPHINE SULFATE 15 MILLIGRAM(S): 50 CAPSULE, EXTENDED RELEASE ORAL at 07:08

## 2021-02-02 RX ADMIN — Medication 1 TABLET(S): at 13:17

## 2021-02-02 RX ADMIN — PANTOPRAZOLE SODIUM 40 MILLIGRAM(S): 20 TABLET, DELAYED RELEASE ORAL at 07:07

## 2021-02-02 RX ADMIN — Medication 5 MILLIGRAM(S): at 18:24

## 2021-02-02 RX ADMIN — Medication 1000 UNIT(S): at 13:17

## 2021-02-02 RX ADMIN — Medication 325 MILLIGRAM(S): at 13:17

## 2021-02-02 RX ADMIN — POLYETHYLENE GLYCOL 3350 17 GRAM(S): 17 POWDER, FOR SOLUTION ORAL at 13:17

## 2021-02-02 RX ADMIN — Medication 50 MILLIGRAM(S): at 21:42

## 2021-02-02 RX ADMIN — MAGNESIUM OXIDE 400 MG ORAL TABLET 400 MILLIGRAM(S): 241.3 TABLET ORAL at 17:31

## 2021-02-02 NOTE — PROGRESS NOTE ADULT - SUBJECTIVE AND OBJECTIVE BOX
O/N Events: KALANI  Subjective/ROS: Denies HA, CP, SOB, n/v, changes in bowel/urinary habits.  12pt ROS otherwise negative.    VITALS  Vital Signs Last 24 Hrs  T(C): 36.4 (02 Feb 2021 13:25), Max: 36.6 (02 Feb 2021 05:49)  T(F): 97.5 (02 Feb 2021 13:25), Max: 97.8 (02 Feb 2021 05:49)  HR: 72 (02 Feb 2021 13:25) (71 - 72)  BP: 162/94 (02 Feb 2021 13:25) (158/94 - 162/94)  BP(mean): --  RR: 17 (02 Feb 2021 13:25) (17 - 17)  SpO2: 97% (02 Feb 2021 13:25) (97% - 99%)    CAPILLARY BLOOD GLUCOSE          PHYSICAL EXAM  General: A&Ox3; NAD  Head: NC/AT; MMM; PERRL; EOMI;  Neck: Supple; no JVD  Respiratory: CTA B/L; no wheezes/crackles   Cardiovascular: Regular rhythm/rate; S1/S2   Gastrointestinal: Soft; NTND; normoactive BS  Extremities: WWP; no edema/cyanosis  Neurological:  Patient with contracted right forearm and left arm.  Patient cannot move his bilateral lower extremities     MEDICATIONS  (STANDING):  artificial  tears Solution 1 Drop(s) Both EYES four times a day  ascorbic acid 500 milliGRAM(s) Oral daily  atorvastatin 20 milliGRAM(s) Oral at bedtime  baclofen PF IntraThecal 120 MICROGram(s) IntraThecal every 24 hours  cholecalciferol 1000 Unit(s) Oral daily  ferrous    sulfate 325 milliGRAM(s) Oral daily  metoprolol tartrate 100 milliGRAM(s) Oral every 12 hours  morphine ER Tablet 15 milliGRAM(s) Oral every 8 hours  multivitamin/minerals 1 Tablet(s) Oral daily  oxybutynin 5 milliGRAM(s) Oral every 12 hours  pantoprazole    Tablet 40 milliGRAM(s) Oral every 12 hours  polyethylene glycol 3350 17 Gram(s) Oral daily  pregabalin 50 milliGRAM(s) Oral every 8 hours  senna 2 Tablet(s) Oral at bedtime  sodium chloride 0.65% Nasal 1 Spray(s) Both Nostrils two times a day  zinc sulfate 220 milliGRAM(s) Oral daily    MEDICATIONS  (PRN):  acetaminophen   Tablet .. 650 milliGRAM(s) Oral every 6 hours PRN Moderate Pain (4 - 6)      No Known Allergies      LABS                        10.7   8.13  )-----------( 230      ( 02 Feb 2021 11:44 )             35.5     02-02    143  |  103  |  5<L>  ----------------------------<  100<H>  4.6   |  30  |  0.65    Ca    9.0      02 Feb 2021 11:44  Phos  3.8     02-02  Mg     1.5     02-02    TPro  6.7  /  Alb  3.0<L>  /  TBili  0.2  /  DBili  x   /  AST  18  /  ALT  14  /  AlkPhos  81  02-02              IMAGING/EKG/ETC  EKG:  Xray:  CT:  MRI:

## 2021-02-02 NOTE — DISCHARGE NOTE NURSING/CASE MANAGEMENT/SOCIAL WORK - NSDCFUADDAPPT_GEN_ALL_CORE_FT
Please follow up with your primary care physician Dr. Farias who you follow up with while at [facility]    Please note that the office of Dr. Cullen Blum will contact you directly to schedule an appointment following discharge from the hospital.  Appointment was facilitated by Ms. NORMA Lindsay, Referral Coordinator.

## 2021-02-02 NOTE — PROGRESS NOTE ADULT - PROBLEM SELECTOR PLAN 5
- Per nursing home records, pt on metoprolol tartrate 100 mg BID  - Uncontrolled this admission.   - BP's have been obtained in [facility], which is contracted, likely false readings.   - Staff has been unable to obtain BP in [facility] due to PICC line.   - Once PICC is removed, vitals to be obtained from [facility].  - Norvasc 5mg PO daily added.     #HLD  - c/w atorvastatin 20 mg QHS

## 2021-02-02 NOTE — DISCHARGE NOTE NURSING/CASE MANAGEMENT/SOCIAL WORK - PATIENT PORTAL LINK FT
You can access the FollowMyHealth Patient Portal offered by [facility] by registering at the following website: [email or web address]. By joining [facility]’s FollowMyHealth portal, you will also be able to view your health information using other applications (apps) compatible with our system.

## 2021-02-02 NOTE — PROGRESS NOTE ADULT - PROBLEM SELECTOR PLAN 9
F: s/p 4U PRBC  E: replete PRN  N: clear liquid    DVT ppx: SCDs, hold lovenox in setting of r/o GIB  Dispo:   FULL CODE done

## 2021-02-02 NOTE — PROGRESS NOTE ADULT - PROBLEM SELECTOR PROBLEM 1
Severe anemia

## 2021-02-02 NOTE — PROGRESS NOTE ADULT - PROVIDER SPECIALTY LIST ADULT
Internal Medicine
MICU
Gastroenterology
Internal Medicine
Hospitalist
Internal Medicine
Internal Medicine

## 2021-03-15 NOTE — ED ADULT NURSE NOTE - CAS TRG GENERAL AIRWAY, MLM
Patent Propranolol Counseling:  I discussed with the patient the risks of propranolol including but not limited to low heart rate, low blood pressure, low blood sugar, restlessness and increased cold sensitivity. They should call the office if they experience any of these side effects.

## 2021-05-08 NOTE — PROGRESS NOTE ADULT - PROBLEM SELECTOR PLAN 2
Possible upper GI bleed   - Per ED rectal exam, ?melena, black secretions. positive FOBT.  - Transfused as above  - F/u GI recs and results of 1/28 colonoscopy + endoscopy   - c/w PPI gtt and keep NPO  - maintain active type and screen  - maintain IV access 39 year old male with pmhx of schizophrenia, mood disorder, seizures on keppra, presents with s/p seizure today. Story is unclear, possible seizure with fall to right side.

## 2022-01-20 NOTE — PROGRESS NOTE ADULT - PROBLEM SELECTOR PLAN 5
- per nursing home records, pt on metoprolol tartrate 100 mg BID  - restarted s/p EGD    #HLD  - c/w atorvastatin 20 mg QHS None

## 2022-11-17 NOTE — ED ADULT NURSE NOTE - GASTROINTESTINAL ASSESSMENT
Physical Therapy  Patient not seen in therapy.     Per RN pt scheduled for disch        OBJECTIVE                     Therapy procedure time and total treatment time can be found documented on the Time Entry flowsheet   - - -